# Patient Record
Sex: FEMALE | Race: WHITE | NOT HISPANIC OR LATINO | Employment: STUDENT | URBAN - METROPOLITAN AREA
[De-identification: names, ages, dates, MRNs, and addresses within clinical notes are randomized per-mention and may not be internally consistent; named-entity substitution may affect disease eponyms.]

---
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
## 2024-01-10 ENCOUNTER — PATIENT OUTREACH (OUTPATIENT)
Dept: ADMINISTRATIVE | Facility: HOSPITAL | Age: 19
End: 2024-01-10
Payer: COMMERCIAL

## 2024-01-10 ENCOUNTER — PATIENT MESSAGE (OUTPATIENT)
Dept: ADMINISTRATIVE | Facility: HOSPITAL | Age: 19
End: 2024-01-10
Payer: COMMERCIAL

## 2024-02-22 DIAGNOSIS — F41.9 ANXIETY AND DEPRESSION: ICD-10-CM

## 2024-02-22 DIAGNOSIS — F32.A ANXIETY AND DEPRESSION: ICD-10-CM

## 2024-02-22 RX ORDER — HYDROXYZINE HYDROCHLORIDE 25 MG/1
TABLET, FILM COATED ORAL
Qty: 30 TABLET | Refills: 2 | Status: SHIPPED | OUTPATIENT
Start: 2024-02-22

## 2024-04-11 ENCOUNTER — PATIENT MESSAGE (OUTPATIENT)
Dept: OBSTETRICS AND GYNECOLOGY | Facility: CLINIC | Age: 19
End: 2024-04-11
Payer: COMMERCIAL

## 2024-06-02 DIAGNOSIS — F32.A ANXIETY AND DEPRESSION: ICD-10-CM

## 2024-06-02 DIAGNOSIS — F41.9 ANXIETY AND DEPRESSION: ICD-10-CM

## 2024-06-03 RX ORDER — ESCITALOPRAM OXALATE 10 MG/1
10 TABLET ORAL
Qty: 90 TABLET | Refills: 0 | OUTPATIENT
Start: 2024-06-03

## 2024-06-05 ENCOUNTER — OFFICE VISIT (OUTPATIENT)
Dept: FAMILY MEDICINE | Facility: CLINIC | Age: 19
End: 2024-06-05
Payer: COMMERCIAL

## 2024-06-05 ENCOUNTER — OFFICE VISIT (OUTPATIENT)
Dept: PSYCHIATRY | Facility: CLINIC | Age: 19
End: 2024-06-05
Payer: COMMERCIAL

## 2024-06-05 VITALS
SYSTOLIC BLOOD PRESSURE: 120 MMHG | BODY MASS INDEX: 20.31 KG/M2 | HEIGHT: 63 IN | DIASTOLIC BLOOD PRESSURE: 82 MMHG | OXYGEN SATURATION: 99 % | HEART RATE: 89 BPM | WEIGHT: 114.63 LBS

## 2024-06-05 VITALS
DIASTOLIC BLOOD PRESSURE: 77 MMHG | WEIGHT: 115 LBS | HEART RATE: 89 BPM | BODY MASS INDEX: 20.37 KG/M2 | SYSTOLIC BLOOD PRESSURE: 120 MMHG

## 2024-06-05 DIAGNOSIS — F32.1 CURRENT MODERATE EPISODE OF MAJOR DEPRESSIVE DISORDER WITHOUT PRIOR EPISODE: ICD-10-CM

## 2024-06-05 DIAGNOSIS — G47.00 INSOMNIA, UNSPECIFIED TYPE: ICD-10-CM

## 2024-06-05 DIAGNOSIS — Z76.89 ENCOUNTER TO ESTABLISH CARE WITH NEW DOCTOR: ICD-10-CM

## 2024-06-05 DIAGNOSIS — F41.1 GAD (GENERALIZED ANXIETY DISORDER): Primary | ICD-10-CM

## 2024-06-05 DIAGNOSIS — Z98.890 STATUS POST ABLATION OF ACCESSORY BYPASS TRACT: ICD-10-CM

## 2024-06-05 DIAGNOSIS — Z00.00 ANNUAL PHYSICAL EXAM: Primary | ICD-10-CM

## 2024-06-05 DIAGNOSIS — F41.1 GAD (GENERALIZED ANXIETY DISORDER): ICD-10-CM

## 2024-06-05 PROCEDURE — 90792 PSYCH DIAG EVAL W/MED SRVCS: CPT | Mod: S$GLB,,, | Performed by: PSYCHIATRY & NEUROLOGY

## 2024-06-05 PROCEDURE — 1159F MED LIST DOCD IN RCRD: CPT | Mod: CPTII,S$GLB,, | Performed by: FAMILY MEDICINE

## 2024-06-05 PROCEDURE — 1160F RVW MEDS BY RX/DR IN RCRD: CPT | Mod: CPTII,S$GLB,, | Performed by: PSYCHIATRY & NEUROLOGY

## 2024-06-05 PROCEDURE — 3074F SYST BP LT 130 MM HG: CPT | Mod: CPTII,S$GLB,, | Performed by: FAMILY MEDICINE

## 2024-06-05 PROCEDURE — 3078F DIAST BP <80 MM HG: CPT | Mod: CPTII,S$GLB,, | Performed by: PSYCHIATRY & NEUROLOGY

## 2024-06-05 PROCEDURE — 99395 PREV VISIT EST AGE 18-39: CPT | Mod: S$GLB,,, | Performed by: FAMILY MEDICINE

## 2024-06-05 PROCEDURE — 3074F SYST BP LT 130 MM HG: CPT | Mod: CPTII,S$GLB,, | Performed by: PSYCHIATRY & NEUROLOGY

## 2024-06-05 PROCEDURE — 3079F DIAST BP 80-89 MM HG: CPT | Mod: CPTII,S$GLB,, | Performed by: FAMILY MEDICINE

## 2024-06-05 PROCEDURE — 99999 PR PBB SHADOW E&M-EST. PATIENT-LVL III: CPT | Mod: PBBFAC,,, | Performed by: FAMILY MEDICINE

## 2024-06-05 PROCEDURE — 1159F MED LIST DOCD IN RCRD: CPT | Mod: CPTII,S$GLB,, | Performed by: PSYCHIATRY & NEUROLOGY

## 2024-06-05 PROCEDURE — 3008F BODY MASS INDEX DOCD: CPT | Mod: CPTII,S$GLB,, | Performed by: FAMILY MEDICINE

## 2024-06-05 PROCEDURE — 99999 PR PBB SHADOW E&M-EST. PATIENT-LVL III: CPT | Mod: PBBFAC,,, | Performed by: PSYCHIATRY & NEUROLOGY

## 2024-06-05 RX ORDER — TRAZODONE HYDROCHLORIDE 50 MG/1
50 TABLET ORAL NIGHTLY
Qty: 30 TABLET | Refills: 1 | Status: SHIPPED | OUTPATIENT
Start: 2024-06-05 | End: 2025-06-05

## 2024-06-05 RX ORDER — ESCITALOPRAM OXALATE 20 MG/1
20 TABLET ORAL DAILY
Qty: 90 TABLET | Refills: 0 | Status: SHIPPED | OUTPATIENT
Start: 2024-06-05 | End: 2025-06-05

## 2024-06-05 NOTE — PROGRESS NOTES
(Portions of this note were dictated using voice recognition software and may contain dictation related errors in spelling/grammar/syntax not found on text review)    CC:   Chief Complaint   Patient presents with    [facility]       HPI: 18 y.o. female presented to [facility] as a new patient for routine annual checkup and labs.  She has medical history significant for generalized anxiety disorder, major depression, insomnia.  SVT status post cardiac ablation.    She follows up with a psychiatrist, had most recent visit this morning.  She takes Lexapro 20 mg daily along with hydroxyzine 25 mg on as needed basis, symptoms are stable.  She stated that trazodone was just added to help with the sleep this morning.    She is a student in [place] and currently at home.  She is physically active.      She has Nexplanon for contraception, denies having any menstrual cycle.    She is due for annual labs.    She does not smoke, has no toxic habits.      She denies having any other symptoms or concerns.    Past Medical History:   Diagnosis Date    Anxiety disorder, unspecified     Arm fracture     Depression     Staph infection     SVT (supraventricular tachycardia)        Past Surgical History:   Procedure Laterality Date    ABLATION Bilateral 3/24/2023    Procedure: Ablation;  Surgeon: Elmer Platt MD;  Location: [facility];  Service: Cardiology;  Laterality: Bilateral;  SVT, RFA, +/- loop;  DIANE, MDT; Gen/MAC, 3prep, Lc       Family History   Problem Relation Name Age of Onset    No Known Problems Mother      Stroke Father      Hypertension Father      Obesity Father      ADD / ADHD Sister 2     Bipolar disorder Sister 2     Cardiomyopathy Maternal Grandmother      Arrhythmia Maternal Grandmother          HCOM and JORDAN    Heart attack Maternal Grandmother      Heart disease Maternal Grandfather      Heart attack Maternal Grandfather      Heart disease Paternal Grandmother      Cancer Paternal Grandmother       Congenital heart disease Paternal Grandmother      Stroke Paternal Grandmother      Heart attack Paternal Grandmother      Stroke Paternal Grandfather      Early death Neg Hx      Heart attacks under age 50 Neg Hx      Pacemaker/defibrilator Neg Hx         Social History     Tobacco Use    Smoking status: Never    Smokeless tobacco: Never   Substance Use Topics    Alcohol use: Never    Drug use: Never       Lab Results   Component Value Date    WBC 5.52 09/02/2022    HGB 13.2 09/02/2022    HCT 41.0 09/02/2022    MCV 84 09/02/2022     09/02/2022    ALT 16 09/02/2022    AST 29 09/02/2022    BILITOT 0.3 09/02/2022    ALKPHOS 66 09/02/2022     (L) 09/02/2022    K 3.6 09/02/2022     09/02/2022    CREATININE 0.7 09/02/2022    CALCIUM 8.8 09/02/2022    ALBUMIN 3.8 09/02/2022    BUN 6 09/02/2022    CO2 22 (L) 09/02/2022    TSH 1.079 01/05/2023    GLU 83 09/02/2022             Vital signs reviewed  PE:   APPEARANCE: Well nourished, well developed, in no acute distress.    HEAD: Normocephalic, atraumatic.  EYES: EOMI.  Conjunctivae noninjected.  EAR:  Cerumen impaction in right ear  NOSE: Mucosa pink. Airway clear  NECK: Supple with no cervical lymphadenopathy.    CHEST: Good inspiratory effort. Lungs clear to auscultation with no wheezes or crackles.  CARDIOVASCULAR: Normal S1, S2. No rubs, murmurs, or gallops.  ABDOMEN: Bowel sounds normal. Not distended. Soft. No tenderness or masses. No organomegaly.  EXTREMITIES: No edema, cyanosis, or clubbing.    Review of Systems   Constitutional:  Negative for chills, fatigue and fever.   HENT: Negative.     Respiratory:  Negative for cough, shortness of breath and wheezing.    Cardiovascular:  Negative for chest pain, palpitations and leg swelling.   Gastrointestinal: Negative.    Genitourinary: Negative.    Neurological: Negative.    Psychiatric/Behavioral: Negative.     All other systems reviewed and are negative.      IMPRESSION  1. Annual physical exam    2.  RUPINDER (generalized anxiety disorder)    3. Current moderate episode of major depressive disorder without prior episode    4. Status post ablation of accessory bypass tract    5. Encounter to establish care with new doctor    6. Insomnia, unspecified type            PLAN      1. Annual physical exam    - CBC Auto Differential; Future  - Comprehensive Metabolic Panel; Future  - TSH; Future  - Lipid Panel; Future  - Hemoglobin A1C; Future      2. RUPINDER (generalized anxiety disorder)      3. Current moderate episode of major depressive disorder without prior episode    Stable     Followed by psychiatry     Continue current medications      4. Status post ablation of accessory bypass tract    Stable    Denies having any problems      5. Encounter to establish care with new doctor      6. Insomnia, unspecified type     To start trazodone        SCREENINGS      Immunizations:     Up-to-date with Tdap     Had 4 doses of COVID vaccine       Age/demographic appropriate health maintenance:    Health Maintenance Due   Topic Date Due    Lipid Panel  Never done           Spent adequate time in obtaining history and explaining differentials     30 minutes spent during this visit of which greater than 50% devoted to face-face counseling and coordination of care regarding diagnosis and management plan       Miguel Ángel Hardy   6/5/2024
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
## 2024-06-06 ENCOUNTER — LAB VISIT (OUTPATIENT)
Dept: LAB | Facility: HOSPITAL | Age: 19
End: 2024-06-06
Attending: FAMILY MEDICINE
Payer: COMMERCIAL

## 2024-06-06 DIAGNOSIS — Z00.00 ANNUAL PHYSICAL EXAM: ICD-10-CM

## 2024-06-06 LAB
ALBUMIN SERPL BCP-MCNC: 3.9 G/DL (ref 3.5–5.2)
ALP SERPL-CCNC: 65 U/L (ref 55–135)
ALT SERPL W/O P-5'-P-CCNC: 8 U/L (ref 10–44)
ANION GAP SERPL CALC-SCNC: 9 MMOL/L (ref 8–16)
AST SERPL-CCNC: 16 U/L (ref 10–40)
BASOPHILS # BLD AUTO: 0.05 K/UL (ref 0–0.2)
BASOPHILS NFR BLD: 0.8 % (ref 0–1.9)
BILIRUB SERPL-MCNC: 0.7 MG/DL (ref 0.1–1)
BUN SERPL-MCNC: 8 MG/DL (ref 6–20)
CALCIUM SERPL-MCNC: 9.5 MG/DL (ref 8.7–10.5)
CHLORIDE SERPL-SCNC: 107 MMOL/L (ref 95–110)
CHOLEST SERPL-MCNC: 129 MG/DL (ref 120–199)
CHOLEST/HDLC SERPL: 2.4 {RATIO} (ref 2–5)
CO2 SERPL-SCNC: 23 MMOL/L (ref 23–29)
CREAT SERPL-MCNC: 0.8 MG/DL (ref 0.5–1.4)
DIFFERENTIAL METHOD BLD: ABNORMAL
EOSINOPHIL # BLD AUTO: 0.6 K/UL (ref 0–0.5)
EOSINOPHIL NFR BLD: 9.1 % (ref 0–8)
ERYTHROCYTE [DISTWIDTH] IN BLOOD BY AUTOMATED COUNT: 12.5 % (ref 11.5–14.5)
EST. GFR  (NO RACE VARIABLE): >60 ML/MIN/1.73 M^2
ESTIMATED AVG GLUCOSE: 103 MG/DL (ref 68–131)
GLUCOSE SERPL-MCNC: 90 MG/DL (ref 70–110)
HBA1C MFR BLD: 5.2 % (ref 4–5.6)
HCT VFR BLD AUTO: 43.5 % (ref 37–48.5)
HDLC SERPL-MCNC: 53 MG/DL (ref 40–75)
HDLC SERPL: 41.1 % (ref 20–50)
HGB BLD-MCNC: 13.6 G/DL (ref 12–16)
IMM GRANULOCYTES # BLD AUTO: 0.02 K/UL (ref 0–0.04)
IMM GRANULOCYTES NFR BLD AUTO: 0.3 % (ref 0–0.5)
LDLC SERPL CALC-MCNC: 60.2 MG/DL (ref 63–159)
LYMPHOCYTES # BLD AUTO: 1.6 K/UL (ref 1–4.8)
LYMPHOCYTES NFR BLD: 24.6 % (ref 18–48)
MCH RBC QN AUTO: 26.2 PG (ref 27–31)
MCHC RBC AUTO-ENTMCNC: 31.3 G/DL (ref 32–36)
MCV RBC AUTO: 84 FL (ref 82–98)
MONOCYTES # BLD AUTO: 0.5 K/UL (ref 0.3–1)
MONOCYTES NFR BLD: 8 % (ref 4–15)
NEUTROPHILS # BLD AUTO: 3.7 K/UL (ref 1.8–7.7)
NEUTROPHILS NFR BLD: 57.2 % (ref 38–73)
NONHDLC SERPL-MCNC: 76 MG/DL
NRBC BLD-RTO: 0 /100 WBC
PLATELET # BLD AUTO: 287 K/UL (ref 150–450)
PMV BLD AUTO: 10.3 FL (ref 9.2–12.9)
POTASSIUM SERPL-SCNC: 4.4 MMOL/L (ref 3.5–5.1)
PROT SERPL-MCNC: 7.1 G/DL (ref 6–8.4)
RBC # BLD AUTO: 5.2 M/UL (ref 4–5.4)
SODIUM SERPL-SCNC: 139 MMOL/L (ref 136–145)
TRIGL SERPL-MCNC: 79 MG/DL (ref 30–150)
TSH SERPL DL<=0.005 MIU/L-ACNC: 2.34 UIU/ML (ref 0.4–4)
WBC # BLD AUTO: 6.47 K/UL (ref 3.9–12.7)

## 2024-06-06 PROCEDURE — 83036 HEMOGLOBIN GLYCOSYLATED A1C: CPT | Performed by: FAMILY MEDICINE

## 2024-06-06 PROCEDURE — 80053 COMPREHEN METABOLIC PANEL: CPT | Performed by: FAMILY MEDICINE

## 2024-06-06 PROCEDURE — 85025 COMPLETE CBC W/AUTO DIFF WBC: CPT | Performed by: FAMILY MEDICINE

## 2024-06-06 PROCEDURE — 36415 COLL VENOUS BLD VENIPUNCTURE: CPT | Performed by: FAMILY MEDICINE

## 2024-06-06 PROCEDURE — 80061 LIPID PANEL: CPT | Performed by: FAMILY MEDICINE

## 2024-06-06 PROCEDURE — 84443 ASSAY THYROID STIM HORMONE: CPT | Performed by: FAMILY MEDICINE

## 2024-07-12 ENCOUNTER — OFFICE VISIT (OUTPATIENT)
Dept: OTOLARYNGOLOGY | Facility: CLINIC | Age: 19
End: 2024-07-12
Payer: COMMERCIAL

## 2024-07-12 ENCOUNTER — LAB VISIT (OUTPATIENT)
Dept: LAB | Facility: HOSPITAL | Age: 19
End: 2024-07-12
Attending: OTOLARYNGOLOGY
Payer: COMMERCIAL

## 2024-07-12 VITALS
HEART RATE: 116 BPM | BODY MASS INDEX: 19.78 KG/M2 | SYSTOLIC BLOOD PRESSURE: 114 MMHG | WEIGHT: 111.69 LBS | DIASTOLIC BLOOD PRESSURE: 78 MMHG

## 2024-07-12 DIAGNOSIS — J34.2 NASAL SEPTAL DEVIATION: ICD-10-CM

## 2024-07-12 DIAGNOSIS — J30.9 CHRONIC ALLERGIC RHINITIS: Primary | ICD-10-CM

## 2024-07-12 DIAGNOSIS — J30.9 CHRONIC ALLERGIC RHINITIS: ICD-10-CM

## 2024-07-12 DIAGNOSIS — J34.3 HYPERTROPHY OF INFERIOR NASAL TURBINATE: ICD-10-CM

## 2024-07-12 DIAGNOSIS — J34.89 NASAL OBSTRUCTION: ICD-10-CM

## 2024-07-12 PROCEDURE — 86003 ALLG SPEC IGE CRUDE XTRC EA: CPT | Performed by: OTOLARYNGOLOGY

## 2024-07-12 PROCEDURE — 99999 PR PBB SHADOW E&M-EST. PATIENT-LVL III: CPT | Mod: PBBFAC,,, | Performed by: OTOLARYNGOLOGY

## 2024-07-12 PROCEDURE — 86003 ALLG SPEC IGE CRUDE XTRC EA: CPT | Mod: 59 | Performed by: OTOLARYNGOLOGY

## 2024-07-12 PROCEDURE — 36415 COLL VENOUS BLD VENIPUNCTURE: CPT | Performed by: OTOLARYNGOLOGY

## 2024-07-12 RX ORDER — AZELASTINE 1 MG/ML
1 SPRAY, METERED NASAL 2 TIMES DAILY
Qty: 30 ML | Refills: 3 | Status: SHIPPED | OUTPATIENT
Start: 2024-07-12 | End: 2025-07-12

## 2024-07-12 RX ORDER — LEVOCETIRIZINE DIHYDROCHLORIDE 5 MG/1
5 TABLET, FILM COATED ORAL NIGHTLY
Qty: 30 TABLET | Refills: 11 | Status: SHIPPED | OUTPATIENT
Start: 2024-07-12 | End: 2025-07-12

## 2024-07-12 RX ORDER — METHYLPREDNISOLONE 4 MG/1
TABLET ORAL
Qty: 1 EACH | Refills: 0 | Status: SHIPPED | OUTPATIENT
Start: 2024-07-12

## 2024-07-12 RX ORDER — AMOXICILLIN AND CLAVULANATE POTASSIUM 875; 125 MG/1; MG/1
1 TABLET, FILM COATED ORAL 2 TIMES DAILY
Qty: 20 TABLET | Refills: 0 | Status: CANCELLED | OUTPATIENT
Start: 2024-07-12 | End: 2024-07-22

## 2024-07-12 RX ORDER — FLUTICASONE PROPIONATE 50 MCG
2 SPRAY, SUSPENSION (ML) NASAL DAILY
Qty: 9.9 ML | Refills: 11 | Status: SHIPPED | OUTPATIENT
Start: 2024-07-12

## 2024-07-12 NOTE — PROGRESS NOTES
Chief Complaint   Patient presents with    Nasal Congestion     Nasal blockage for more than three years also causes her to choke when eating and also throat agitation    .    HPI:     Lupe Machuca is a 18 y.o. female who presents for evaluation of a several year history of nasal obstruction. She reports that she has been having blockage of her nose on both sides. She states that she feels that she can not breath through her nose when eating/drinking.  She reports that she has had decreased in sense of smell. She has been snoring as well. This problem has been gradually worsening.  She has been using zyrtec daily. She has been using brenna's vaporub to her nose nightly. She does not use sinus rinses or nasal sprays. She admits to midface pain and pressure.  She admits to rhinorrhea and postnasal drip.   She has not had sinus or nasal surgery. There is no history of sinonasal trauma. She has not had formal allergy testing. She notes that symptoms seem to be worse around dogs/cats. 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           Past Medical History:   Diagnosis Date    Anxiety disorder, unspecified     Arm fracture     Depression     Staph infection     SVT (supraventricular tachycardia)      Social History     Socioeconomic History    Marital status: Single   Tobacco Use    Smoking status: Never    Smokeless tobacco: Never   Substance and Sexual Activity    Alcohol use: Never    Drug use: Never    Sexual activity: Yes     Partners: Male     Birth control/protection: Condom   Social History Narrative    Lives with mom, stepdad, sister, and nephew.     2 dogs.    No smokers    In 12th grade.       Social Determinants of Health     Financial Resource Strain: Low Risk  (6/5/2024)    Overall Financial Resource Strain (CARDIA)     Difficulty of Paying Living Expenses: Not hard at all   Food Insecurity: No Food Insecurity (6/5/2024)    Hunger Vital Sign     Worried About Running Out of Food in the Last Year:  Never true     Ran Out of Food in the Last Year: Never true   Physical Activity: Insufficiently Active (6/5/2024)    Exercise Vital Sign     Days of Exercise per Week: 2 days     Minutes of Exercise per Session: 20 min   Stress: Stress Concern Present (6/5/2024)    [place] [employer] - Occupational Stress Questionnaire     Feeling of Stress : Very much   Housing Stability: Unknown (6/5/2024)    Housing Stability Vital Sign     Unable to Pay for Housing in the Last Year: Patient declined     Past Surgical History:   Procedure Laterality Date    ABLATION Bilateral 3/24/2023    Procedure: Ablation;  Surgeon: Elmer Platt MD;  Location: [facility] LAB;  Service: Cardiology;  Laterality: Bilateral;  SVT, RFA, +/- loop;  DIANE, GABY; Gen/MAC, 3prepLc     Family History   Problem Relation Name Age of Onset    No Known Problems Mother      Stroke Father      Hypertension Father      Obesity Father      ADD / ADHD Sister 2     Bipolar disorder Sister 2     Cardiomyopathy Maternal Grandmother      Arrhythmia Maternal Grandmother          HCOM and JORDAN    Heart attack Maternal Grandmother      Heart disease Maternal Grandfather      Heart attack Maternal Grandfather      Heart disease Paternal Grandmother      Cancer Paternal Grandmother      Congenital heart disease Paternal Grandmother      Stroke Paternal Grandmother      Heart attack Paternal Grandmother      Stroke Paternal Grandfather      Early death Neg Hx      Heart attacks under age 50 Neg Hx      Pacemaker/defibrilator Neg Hx             Review of Systems  General: negative for chills, fever or weight loss  Psychological: negative for mood changes or depression  Ophthalmic: negative for blurry vision, photophobia or eye pain  ENT: see HPI  Respiratory: no cough, shortness of breath, or wheezing  Cardiovascular: no chest pain or dyspnea on exertion  Gastrointestinal: no abdominal pain, change in bowel habits, or black/ bloody  stools  Musculoskeletal: negative for gait disturbance or muscular weakness  Neurological: no syncope or seizures; no ataxia  Dermatological: negative for puritis,  rash and jaundice  Hematologic/lymphatic: no easy bruising, no new lumps or bumps      Physical Exam:    Vitals:    07/12/24 0836   BP: 114/78   Pulse: (!) 116       Constitutional: Well appearing / communicating without difficutly.  NAD.  Eyes: EOM I Bilaterally  Head/Face: Normocephalic.  Negative paranasal sinus pressure/tenderness.  Salivary glands WNL.  House Brackmann I Bilaterally.    Right Ear: Auricle normal appearance. External Auditory Canal within normal limits,TM w/o masses/lesions/perforations. TM mobility noted.   Left Ear: Auricle normal appearance. External Auditory Canal WNL,TM w/o masses/lesions/perforations. TM mobility noted.  Nose: + severe mucosal edema; + nasal septal deviation. Inferior Turbinates boggy, edematous, hypertrophic with clear rhinorrhea present  bilaterally. No septal perforation. No masses/lesions. External nasal skin appears normal without masses/lesions.  Oral Cavity: Gingiva/lips within normal limits.  Dentition/gingiva healthy appearing. Mucus membranes moist. Floor of mouth soft, no masses palpated. Oral Tongue mobile. Hard Palate appears normal.    Oropharynx: Base of tongue appears normal. No masses/lesions noted. Tonsillar fossa/pharyngeal wall without lesions. Posterior oropharynx WNL.  Soft palate without masses. Midline uvula.   Neck/Lymphatic: No LAD I-VI bilaterally.  No thyromegaly.  No masses noted on exam.        Assessment:    ICD-10-CM ICD-9-CM    1. Chronic allergic rhinitis  J30.9 477.9 Aspergillus fumagatus IgE      Bermuda grass IgE      Cat epithelium IgE      Cladosporium IgE      [place], bald IgE      D. farinae IgE      D. pteronyssinus IgE      Dog dander IgE      Plantain, English IgE      Christiano grass IgE      Marsh elder, rough IgE      Mugwort IgE      Nettle IgE      Oak, white IgE       Penicillium IgE      Ragweed, short, common IgE      Aditya IgE      Allergen, Cocklebur      Allergen, Elm [place]      Allergen, Meadow Grass ([place] Blue)      Allergen, Mucor Racemosus      Allergen, Pecan Tree IgE      Allergen, White Mychal      Allergen-Alternaria Alternata      Allergen-[place]      Allergen-Common Pigweed      Allergen-Silver Birch      Feather Panel #2      RAST Allergen for [place]      RAST Allergen Maple ([place])      RAST Allergen [place]      RAST Allergen, Lamb's Quarters      RAST Allergen, Sheep [place](Yellow Dock)      CT Medtronic Sinuses without      2. Hypertrophy of inferior nasal turbinate  J34.3 478.0       3. Nasal septal deviation  J34.2 470       4. Nasal obstruction  J34.89 478.19 CT Medtronic Sinuses without        The primary encounter diagnosis was Chronic allergic rhinitis. Diagnoses of Hypertrophy of inferior nasal turbinate, Nasal septal deviation, and Nasal obstruction were also pertinent to this visit.      Plan:  Orders Placed This Encounter   Procedures    CT Medtronic Sinuses without    Aspergillus fumagatus IgE    Bermuda grass IgE    Cat epithelium IgE    Cladosporium IgE    [place], bald IgE    D. farinae IgE    D. pteronyssinus IgE    Dog dander IgE    Plantain, English IgE    Christiano grass IgE    Marsh elder, rough IgE    Mugwort IgE    Nettle IgE    Oak, white IgE    Penicillium IgE    Ragweed, short, common IgE    Aditya IgE    Allergen, Cocklebur    Allergen, Elm [place]    Allergen, Meadow Grass ([place] Blue)    Allergen, Mucor Racemosus    Allergen, Pecan Tree IgE    Allergen, White Mychal    Allergen-Alternaria Alternata    Allergen-[place]    Allergen-Common Pigweed    Allergen-Silver Birch    Feather Panel #2    RAST Allergen for [place]    RAST Allergen Maple ([place])    RAST Allergen [place]    RAST Allergen, Lamb's Quarters    RAST Allergen, Sheep [place](Yellow Dock)     I had a long discussion with the patient regarding  the diagnosis of rhinitis medicamentosa.  We discussed the physiologic response of the nasal mucosa to the OTC nasal sprays, and that rhinitis medicamentosa is a condition of rebound nasal congestion as a result of extended use of topical decongestants that constrict blood vessels in the lining of the nose such as Vicks.  The patient expressed understanding, and is ready to try and stop the use of topical nasal decongestants.  We will start medrol dose amber for the next several days to help with congestion while stopping use of Vicks.    Recommend Alin Med Sinus Rinse BID; distilled water only(maintenance).  Start Flonase 2 sprays per nostril daiy (spray laterally).  Start Astelin 1 spray per nostril BID  Start xyzal 5mg PO daily  Obtain immunoCAP Testing  Obtain CT scan of the sinuses to further assess.     Jo Fairchild MD

## 2024-07-12 NOTE — PROCEDURES
Nasal/sinus endoscopy    Date/Time: 7/12/2024 8:40 AM    Performed by: Jo Sanderson MD  Authorized by: Jo Sanderson MD    Consent Done?:  Yes (Verbal)  Anesthesia:     Local anesthetic:  Lidocaine 2% and Pato-Synephrine 1/2%  Nose:     Procedure Performed:  Nasal Endoscopy  External:      No external nasal deformity  Intranasal:      Mucosa no polyps     Mucosa ulcers not present     No mucosa lesions present     Enlarged turbinates     Septum gross deformity (NSD to the left)  Nasopharynx:      Posterior choanae patent     Eustachian tube patent     Inferior Turbinates boggy, edematous, hypertrophic with clear rhinorrhea present  bilaterally causing blockage of the inferior meatus bilaterally  Bilateral middle turbinate edema and hypertrophy with obstruction of the middle meatus bilaterally.  The superior turbinate was edematous and boggy with narrowed  sphenoethmoidal recess bilaterally.  Note: patient had vasovagal response after procedure responded to conservative management.

## 2024-07-15 ENCOUNTER — HOSPITAL ENCOUNTER (OUTPATIENT)
Dept: RADIOLOGY | Facility: HOSPITAL | Age: 19
Discharge: HOME | End: 2024-07-15
Attending: OTOLARYNGOLOGY
Payer: COMMERCIAL

## 2024-07-15 DIAGNOSIS — J30.9 CHRONIC ALLERGIC RHINITIS: ICD-10-CM

## 2024-07-15 DIAGNOSIS — J34.89 NASAL OBSTRUCTION: ICD-10-CM

## 2024-07-15 PROCEDURE — 70486 CT MAXILLOFACIAL W/O DYE: CPT | Mod: 26,,, | Performed by: RADIOLOGY

## 2024-07-15 PROCEDURE — 70486 CT MAXILLOFACIAL W/O DYE: CPT | Mod: TC

## 2024-07-16 LAB
A ALTERNATA IGE QN: <0.1 KU/L
A FUMIGATUS IGE QN: <0.1 KU/L
ALLERGEN BOXELDER MAPLE TREE IGE: 0.62 KU/L
ALLERGEN MAPLE (BOX ELDER) CLASS: ABNORMAL
ALLERGEN MULBERRY CLASS: NORMAL
ALLERGEN MULBERRY TREE IGE: <0.1 KU/L
ALLERGEN PIGWEED IGE: <0.1 KU/L
ALLERGEN WHITE ASH TREE IGE: 1.04 KU/L
AMER SYCAMORE IGE QN: 0.46 KU/L
BALD CYPRESS IGE QN: <0.1 KU/L
BERMUDA GRASS IGE QN: 0.15 KU/L
C HERBARUM IGE QN: <0.1 KU/L
CAT DANDER IGE QN: 3.46 KU/L
CEDAR IGE QN: <0.1 KU/L
COCKLEBUR IGE QN: 0.12 KU/L
COMMON PIGWEED CLASS: NORMAL
COMMON RAGWEED IGE QN: 0.28 KU/L
D FARINAE IGE QN: 22.3 KU/L
D PTERONYSS IGE QN: 13.6 KU/L
DEPRECATED A ALTERNATA IGE RAST QL: NORMAL
DEPRECATED A FUMIGATUS IGE RAST QL: NORMAL
DEPRECATED BALD CYPRESS IGE RAST QL: NORMAL
DEPRECATED BERMUDA GRASS IGE RAST QL: ABNORMAL
DEPRECATED C HERBARUM IGE RAST QL: NORMAL
DEPRECATED CAT DANDER IGE RAST QL: ABNORMAL
DEPRECATED CEDAR IGE RAST QL: NORMAL
DEPRECATED COCKLEBUR IGE RAST QL: ABNORMAL
DEPRECATED COMMON RAGWEED IGE RAST QL: ABNORMAL
DEPRECATED D FARINAE IGE RAST QL: ABNORMAL
DEPRECATED D PTERONYSS IGE RAST QL: ABNORMAL
DEPRECATED DOG DANDER IGE RAST QL: ABNORMAL
DEPRECATED ELDER IGE RAST QL: NORMAL
DEPRECATED ENGL PLANTAIN IGE RAST QL: NORMAL
DEPRECATED GOOSEFOOT IGE RAST QL: ABNORMAL
DEPRECATED JOHNSON GRASS IGE RAST QL: ABNORMAL
DEPRECATED KENT BLUE GRASS IGE RAST QL: ABNORMAL
DEPRECATED M RACEMOSUS IGE RAST QL: NORMAL
DEPRECATED MUGWORT IGE RAST QL: NORMAL
DEPRECATED NETTLE IGE RAST QL: ABNORMAL
DEPRECATED P NOTATUM IGE RAST QL: NORMAL
DEPRECATED PECAN/HICK TREE IGE RAST QL: ABNORMAL
DEPRECATED SHEEP SORREL IGE RAST QL: NORMAL
DEPRECATED SILVER BIRCH IGE RAST QL: ABNORMAL
DEPRECATED TIMOTHY IGE RAST QL: ABNORMAL
DEPRECATED WHITE OAK IGE RAST QL: ABNORMAL
DOG DANDER IGE QN: 44.8 KU/L
ELDER IGE QN: 0.1 KU/L
ELM CEDAR CLASS: ABNORMAL
ELM CEDAR, IGE: 0.48 KU/L
ENGL PLANTAIN IGE QN: <0.1 KU/L
FEATHER PANEL #2: <0.1 KU/L
GOOSEFOOT IGE QN: 0.14 KU/L
JOHNSON GRASS IGE QN: 0.28 KU/L
KENT BLUE GRASS IGE QN: 0.66 KU/L
M RACEMOSUS IGE QN: <0.1 KU/L
MUGWORT IGE QN: <0.1 KU/L
NETTLE IGE QN: 0.42 KU/L
P NOTATUM IGE QN: <0.1 KU/L
PECAN/HICK TREE IGE QN: 2.29 KU/L
SHEEP SORREL IGE QN: <0.1 KU/L
SILVER BIRCH IGE QN: 9.35 KU/L
TIMOTHY IGE QN: 0.36 KU/L
WHITE ASH CLASS: ABNORMAL
WHITE OAK IGE QN: 14.5 KU/L

## 2024-08-12 ENCOUNTER — OFFICE VISIT (OUTPATIENT)
Dept: OTOLARYNGOLOGY | Facility: CLINIC | Age: 19
End: 2024-08-12
Payer: COMMERCIAL

## 2024-08-12 VITALS
WEIGHT: 115.19 LBS | BODY MASS INDEX: 20.41 KG/M2 | DIASTOLIC BLOOD PRESSURE: 73 MMHG | HEART RATE: 118 BPM | SYSTOLIC BLOOD PRESSURE: 123 MMHG

## 2024-08-12 DIAGNOSIS — J34.89 NASAL OBSTRUCTION: ICD-10-CM

## 2024-08-12 DIAGNOSIS — J34.2 NASAL SEPTAL DEVIATION: Primary | Chronic | ICD-10-CM

## 2024-08-12 DIAGNOSIS — J34.2 NASAL SEPTAL DEVIATION: Primary | ICD-10-CM

## 2024-08-12 DIAGNOSIS — J34.89 CONCHA BULLOSA: ICD-10-CM

## 2024-08-12 DIAGNOSIS — J34.3 HYPERTROPHY OF INFERIOR NASAL TURBINATE: ICD-10-CM

## 2024-08-12 DIAGNOSIS — J30.9 CHRONIC ALLERGIC RHINITIS: Chronic | ICD-10-CM

## 2024-08-12 DIAGNOSIS — J34.3 HYPERTROPHY OF INFERIOR NASAL TURBINATE: Chronic | ICD-10-CM

## 2024-08-12 PROCEDURE — 99999 PR PBB SHADOW E&M-EST. PATIENT-LVL III: CPT | Mod: PBBFAC,,, | Performed by: OTOLARYNGOLOGY

## 2024-08-12 PROCEDURE — 3008F BODY MASS INDEX DOCD: CPT | Mod: CPTII,S$GLB,, | Performed by: OTOLARYNGOLOGY

## 2024-08-12 PROCEDURE — 3044F HG A1C LEVEL LT 7.0%: CPT | Mod: CPTII,S$GLB,, | Performed by: OTOLARYNGOLOGY

## 2024-08-12 PROCEDURE — 1159F MED LIST DOCD IN RCRD: CPT | Mod: CPTII,S$GLB,, | Performed by: OTOLARYNGOLOGY

## 2024-08-12 PROCEDURE — 99214 OFFICE O/P EST MOD 30 MIN: CPT | Mod: S$GLB,,, | Performed by: OTOLARYNGOLOGY

## 2024-08-12 PROCEDURE — 1160F RVW MEDS BY RX/DR IN RCRD: CPT | Mod: CPTII,S$GLB,, | Performed by: OTOLARYNGOLOGY

## 2024-08-12 PROCEDURE — 3074F SYST BP LT 130 MM HG: CPT | Mod: CPTII,S$GLB,, | Performed by: OTOLARYNGOLOGY

## 2024-08-12 PROCEDURE — 3078F DIAST BP <80 MM HG: CPT | Mod: CPTII,S$GLB,, | Performed by: OTOLARYNGOLOGY

## 2024-08-12 NOTE — LETTER
August 12, 2024      [facility]  [address]  [address]  [place] [address]  Phone: [phone]  Fax: [phone]       Patient: Lupe Machuca   YOB: 2005  Date of Visit: 08/12/2024    To Whom It May Concern:    Papa Machuca  was at [facility] on 08/12/2024. The patient has been diagnosed with allergic rhinitis. She is allergic to dog dander which may be effecting her health. Please allow her to work for the remainder of this week.     Sincerely,    Jo Walsh MD

## 2024-08-12 NOTE — PROGRESS NOTES
Chief Complaint   Patient presents with    Follow-up     No improvement since last visit   .    HPI:     Lupe Machuca is a 18 y.o. female who presents for evaluation of a several year history of nasal obstruction. She reports that she has been having blockage of her nose on both sides. She states that she feels that she can not breath through her nose when eating/drinking.  She reports that she has had decreased in sense of smell. She has been snoring as well. This problem has been gradually worsening.  She has been using zyrtec daily. She has been using brenna's vaporub to her nose nightly. She does not use sinus rinses or nasal sprays. She admits to midface pain and pressure.  She admits to rhinorrhea and postnasal drip.   She has not had sinus or nasal surgery. There is no history of sinonasal trauma. She has not had formal allergy testing. She notes that symptoms seem to be worse around dogs/cats. 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       Interval HPI 8/12/2024:  Follow up visit. Reports that she is still feeling nasal congestion/obstruction. She has been using saline, flonase, astelin, and xyzal. She does not feel that this provided much benefit. She has been able to stop brenna's nasal spray. Completed immunoCAP testing and CT scan sinus.       Past Medical History:   Diagnosis Date    Anxiety disorder, unspecified     Arm fracture     Depression     Staph infection     SVT (supraventricular tachycardia)      Social History     Socioeconomic History    Marital status: Single   Tobacco Use    Smoking status: Never    Smokeless tobacco: Never   Substance and Sexual Activity    Alcohol use: Never    Drug use: Never    Sexual activity: Yes     Partners: Male     Birth control/protection: Condom   Social History Narrative    Lives with mom, stepdad, sister, and nephew.     2 dogs.    No smokers    In 12th grade.       Social Determinants of Health     Financial Resource Strain: Low Risk  (6/5/2024)     Overall Financial Resource Strain (CARDIA)     Difficulty of Paying Living Expenses: Not hard at all   Food Insecurity: No Food Insecurity (6/5/2024)    Hunger Vital Sign     Worried About Running Out of Food in the Last Year: Never true     Ran Out of Food in the Last Year: Never true   Physical Activity: Insufficiently Active (6/5/2024)    Exercise Vital Sign     Days of Exercise per Week: 2 days     Minutes of Exercise per Session: 20 min   Stress: Stress Concern Present (6/5/2024)    [place] [employer] - Occupational Stress Questionnaire     Feeling of Stress : Very much   Housing Stability: Unknown (6/5/2024)    Housing Stability Vital Sign     Unable to Pay for Housing in the Last Year: Patient declined     Past Surgical History:   Procedure Laterality Date    ABLATION Bilateral 3/24/2023    Procedure: Ablation;  Surgeon: Elmer Platt MD;  Location: [facility];  Service: Cardiology;  Laterality: Bilateral;  SVT, RFA, +/- loop;  DIANE, MDT; Gen/MAC, 3prep, Lc     Family History   Problem Relation Name Age of Onset    No Known Problems Mother      Stroke Father      Hypertension Father      Obesity Father      ADD / ADHD Sister 2     Bipolar disorder Sister 2     Cardiomyopathy Maternal Grandmother      Arrhythmia Maternal Grandmother          HCOM and JORDAN    Heart attack Maternal Grandmother      Heart disease Maternal Grandfather      Heart attack Maternal Grandfather      Heart disease Paternal Grandmother      Cancer Paternal Grandmother      Congenital heart disease Paternal Grandmother      Stroke Paternal Grandmother      Heart attack Paternal Grandmother      Stroke Paternal Grandfather      Early death Neg Hx      Heart attacks under age 50 Neg Hx      Pacemaker/defibrilator Neg Hx             Review of Systems  General: negative for chills, fever or weight loss  Psychological: negative for mood changes or depression  Ophthalmic: negative for blurry vision, photophobia or eye  pain  ENT: see HPI  Respiratory: no cough, shortness of breath, or wheezing  Cardiovascular: no chest pain or dyspnea on exertion  Gastrointestinal: no abdominal pain, change in bowel habits, or black/ bloody stools  Musculoskeletal: negative for gait disturbance or muscular weakness  Neurological: no syncope or seizures; no ataxia  Dermatological: negative for puritis,  rash and jaundice  Hematologic/lymphatic: no easy bruising, no new lumps or bumps      Physical Exam:    Vitals:    08/12/24 1019   BP: 123/73   Pulse: (!) 118         Constitutional: Well appearing / communicating without difficutly.  NAD.  Eyes: EOM I Bilaterally  Head/Face: Normocephalic.  Negative paranasal sinus pressure/tenderness.  Salivary glands WNL.  House Brackmann I Bilaterally.    Right Ear: Auricle normal appearance. External Auditory Canal within normal limits,TM w/o masses/lesions/perforations. TM mobility noted.   Left Ear: Auricle normal appearance. External Auditory Canal WNL,TM w/o masses/lesions/perforations. TM mobility noted.  Nose: + severe mucosal edema; + nasal septal deviation to the left. Inferior Turbinates boggy, edematous, hypertrophic with clear rhinorrhea present  bilaterally. No septal perforation. No masses/lesions. External nasal skin appears normal without masses/lesions.  Oral Cavity: Gingiva/lips within normal limits.  Dentition/gingiva healthy appearing. Mucus membranes moist. Floor of mouth soft, no masses palpated. Oral Tongue mobile. Hard Palate appears normal.    Oropharynx: Base of tongue appears normal. No masses/lesions noted. Tonsillar fossa/pharyngeal wall without lesions. Posterior oropharynx WNL.  Soft palate without masses. Midline uvula.   Neck/Lymphatic: No LAD I-VI bilaterally.  No thyromegaly.  No masses noted on exam.      Diagnostic testing:   ImmunoCAP:    Latest Reference Range & Units 07/12/24 09:57   A. alternata IgE <0.10 kU/L <0.10   Altern. alternata Class  CLASS 0   Aspergillus  Fumigatus IgE <0.10 kU/L <0.10   A. fumigatus Class  CLASS 0   Allergen Sheep [place] ([place]) IgE <0.10 kU/L <0.10   Allergen Sheep [place] ([place]) Class  CLASS 0   Bald [place] Class  CLASS 0   Bermuda Grass IgE <0.10 kU/L 0.15 (H)   Bermuda Grass Class  CLASS 0/1   [place] Maple Tree IgE <0.10 kU/L 0.62 (H)   Allergen Maple ([place]) Class  CLASS 1   Cat Dander IgE <0.10 kU/L 3.46 (H)   Cat Epithelium Class  CLASS 2   [place] IgE <0.10 kU/L <0.10   [place] Class  CLASS 0   Cladosporium Class  CLASS 0   Cladosporium IgE <0.10 kU/L <0.10   Cocklebur Class  CLASS 0/1   Cocklebur IgE <0.10 kU/L 0.12 (H)   [place] <0.10 kU/L <0.10   D. farinae <0.10 kU/L 22.30 (H)   D. farinae Class  CLASS 4   D. pteronyssinus Dust Mite IgE <0.10 kU/L 13.60 (H)   D. pteronyssinus Class  CLASS 3   Dog Dander IgE <0.10 kU/L 44.80 (H)   Dog Dander Class  CLASS 4   Elm [place], IgE <0.10 kU/L 0.48 (H)   Elm [place] Class  CLASS 1   English Plantain IgE <0.10 kU/L <0.10   English Plantain Class  CLASS 0   Feather Panel #2 <0.70 kU/L <0.10   Christiano Grass IgE <0.10 kU/L 0.28 (H)   Christiano Grass Class  CLASS 0/1   Lamb Quarters IgE <0.10 kU/L 0.14 (H)   Allergen [place] Class  CLASS 0/1   [place] IgE <0.10 kU/L 0.10   M[place]helder Class  CLASS 0/1   Meadow Grass ([place] Blue), IgE <0.10 kU/L 0.66 (H)   Meadow Grass ([place] Blue) Class  CLASS 1   Mucor racemosus, IgE <0.10 kU/L <0.10   Mucor racemosus Class  CLASS 0   [place] Tree IgE <0.10 kU/L <0.10   Allergen [place] Class  CLASS 0   MUGWORT <0.10 kU/L <0.10   Mugwort Class  CLASS 0   NETTLE <0.10 kU/L 0.42 (H)   Nettle Class  CLASS 1   [place], Class  CLASS 3   Pecan [place] Tree IgE <0.10 kU/L 2.29 (H)   Pecan, Class  CLASS 2   Penicillium IgE <0.10 kU/L <0.10   Penicillium Class  CLASS 0   Pigweed IgE <0.10 kU/L <0.10   Common Pigweed Class  CLASS 0   Ragweed, Short, Class  CLASS 0/1   Ragweed, Short, IgE <0.10 kU/L 0.28 (H)   RAST Allergen for [place] <0.70 kU/L 0.46    Silver Birch Tree IgE <0.10 kU/L 9.35 (H)   Silver Birch Class  CLASS 3   Aditya Grass IgE <0.10 kU/L 0.36 (H)   Aditya Grass Class  CLASS 1   White Mychal Tree IgE <0.10 kU/L 1.04 (H)   White Mychal Class  CLASS 2   [place] Tree IgE <0.10 kU/L 14.50 (H)   (H): Data is abnormally high    CT sinus 7/15/2024: images interpreted by me and discussed with patient in detail today.   Impression:     Mild bilateral ethmoid sinus disease.     Bilateral crow bullosa.     Nasal septal deviation to the left.    Assessment:    ICD-10-CM ICD-9-CM    1. Nasal septal deviation  J34.2 470       2. Hypertrophy of inferior nasal turbinate  J34.3 478.0       3. Chronic allergic rhinitis  J30.9 477.9       4. Nasal obstruction  J34.89 478.19       5. Crow bullosa  J34.89 478.19           The primary encounter diagnosis was Nasal septal deviation. Diagnoses of Hypertrophy of inferior nasal turbinate, Chronic allergic rhinitis, Nasal obstruction, and Crow bullosa were also pertinent to this visit.      Plan:  No orders of the defined types were placed in this encounter.      Recommend Alin Med Sinus Rinse BID; distilled water only(maintenance).  Continue Flonase 2 sprays per nostril BID (spray laterally).  Continue Astelin 1 spray per nostril BID  Continue xyzal 5mg PO daily  Referral to allergist placed  She would benefit from septoplasty and turbinate reduction  and removal of bilateral crow bullosa for the treatment of her condition.  I discussed the risks, benefits and alternatives to surgery with the patient, as well as the expected postoperative course.  I gave her the opportunity to ask questions and I answered all of them.  I provided relevant printed information on her condition for her to review at home.  Same-day discharge is anticipated.  She will need evaluation in the pre-anesthesia clinic.   The surgery will be scheduled in the near future.  She will need to return for a postoperative visit 1 week after  surgery.    Jo Fairchild MD

## 2024-08-16 ENCOUNTER — HOSPITAL ENCOUNTER (OUTPATIENT)
Dept: PREADMISSION TESTING | Facility: HOSPITAL | Age: 19
Discharge: HOME | End: 2024-08-16
Attending: NURSE PRACTITIONER
Payer: COMMERCIAL

## 2024-08-16 ENCOUNTER — TELEPHONE (OUTPATIENT)
Dept: OTOLARYNGOLOGY | Facility: CLINIC | Age: 19
End: 2024-08-16
Payer: COMMERCIAL

## 2024-08-16 NOTE — TELEPHONE ENCOUNTER
Pt called to inform us that her mother would like to post pone surgery until December 19th due to school starting soon. Understanding was verbalized and surgery date was updated.

## 2024-08-16 NOTE — DISCHARGE INSTRUCTIONS

## 2024-08-19 ENCOUNTER — OFFICE VISIT (OUTPATIENT)
Dept: ALLERGY | Facility: CLINIC | Age: 19
End: 2024-08-19
Payer: COMMERCIAL

## 2024-08-19 VITALS — HEIGHT: 63 IN | WEIGHT: 117.75 LBS | BODY MASS INDEX: 20.86 KG/M2

## 2024-08-19 DIAGNOSIS — J30.9 CHRONIC ALLERGIC RHINITIS: Primary | Chronic | ICD-10-CM

## 2024-08-19 DIAGNOSIS — K21.9 GASTROESOPHAGEAL REFLUX DISEASE, UNSPECIFIED WHETHER ESOPHAGITIS PRESENT: ICD-10-CM

## 2024-08-19 PROCEDURE — 99204 OFFICE O/P NEW MOD 45 MIN: CPT | Mod: S$GLB,,, | Performed by: ALLERGY & IMMUNOLOGY

## 2024-08-19 PROCEDURE — 3044F HG A1C LEVEL LT 7.0%: CPT | Mod: CPTII,S$GLB,, | Performed by: ALLERGY & IMMUNOLOGY

## 2024-08-19 PROCEDURE — 3008F BODY MASS INDEX DOCD: CPT | Mod: CPTII,S$GLB,, | Performed by: ALLERGY & IMMUNOLOGY

## 2024-08-19 PROCEDURE — 99999 PR PBB SHADOW E&M-EST. PATIENT-LVL III: CPT | Mod: PBBFAC,,, | Performed by: ALLERGY & IMMUNOLOGY

## 2024-08-19 PROCEDURE — 1159F MED LIST DOCD IN RCRD: CPT | Mod: CPTII,S$GLB,, | Performed by: ALLERGY & IMMUNOLOGY

## 2024-08-19 RX ORDER — MONTELUKAST SODIUM 10 MG/1
10 TABLET ORAL NIGHTLY
Qty: 30 TABLET | Refills: 6 | Status: SHIPPED | OUTPATIENT
Start: 2024-08-19

## 2024-08-19 RX ORDER — CIPROFLOXACIN AND DEXAMETHASONE 3; 1 MG/ML; MG/ML
SUSPENSION/ DROPS AURICULAR (OTIC)
COMMUNITY
Start: 2024-02-15

## 2024-08-19 RX ORDER — OMEPRAZOLE 20 MG/1
20 CAPSULE, DELAYED RELEASE ORAL DAILY
Qty: 30 CAPSULE | Refills: 3 | Status: SHIPPED | OUTPATIENT
Start: 2024-08-19 | End: 2025-08-19

## 2024-08-19 NOTE — PROGRESS NOTES
Subjective:       Patient ID: Lupe Machuca is a 18 y.o. female.    Chief Complaint:  Allergy Testing  Allergic rhinitis    HPI:   Patient's symptoms include nasal congestion, postnasal drip, and poor sense of smell . Also w concern of frequent throat clearing after eating and drinking--worse w spicy foods. Also worse when supine. These symptoms are perennial. Current triggers include exposure to  dogs . She works a pets. The patient has been suffering from these symptoms for approximately most of life, worse in recent years. The patient has tried  flonase, astelin, multiple antihistamines  with poor relief of symptoms. Has epistaxis w both nasal sprays, even when preceded by nasal saline. Immunotherapy has never been tried. The patient has never had nasal polyps. The patient has no history of asthma. The patient has no history of eczema. The patient does not suffer from frequent sinopulmonary infections. The patient has not had sinus surgery in the past. But has repair of deviated nasal septum, turbinate reduction, and removal of B crow bullosa scheduled in Dec.  Her dogs (2) don't seem to bother her but others do.  She is in school in [place]. Only home for summer. Sx's not as bad in [place]. She plans to spend majority of next 2-3 yrs in [place] and may want to stay there after school.        Past Medical History:   Diagnosis Date    Angio-edema     Anxiety disorder, unspecified     Arm fracture     Depression     Staph infection     SVT (supraventricular tachycardia)     Urticaria          Family History   Problem Relation Name Age of Onset    No Known Problems Mother      Stroke Father      Hypertension Father      Obesity Father      ADD / ADHD Sister 2     Bipolar disorder Sister 2     Cardiomyopathy Maternal Grandmother      Arrhythmia Maternal Grandmother          HCOM and JORDAN    Heart attack Maternal Grandmother      Allergies Maternal Grandfather      Heart disease Maternal Grandfather      Heart attack Maternal  Grandfather      Heart disease Paternal Grandmother      Cancer Paternal Grandmother      Congenital heart disease Paternal Grandmother      Stroke Paternal Grandmother      Heart attack Paternal Grandmother      Stroke Paternal Grandfather      Early death Neg Hx      Heart attacks under age 50 Neg Hx      Pacemaker/defibrilator Neg Hx           Review of Systems   Constitutional:  Negative for activity change, fatigue and fever.   HENT:  Positive for congestion, postnasal drip and sinus pressure. Negative for rhinorrhea and sneezing.    Eyes:  Negative for discharge, redness and itching.   Respiratory:  Negative for cough, shortness of breath and wheezing.    Cardiovascular:  Negative for chest pain.   Gastrointestinal:  Negative for diarrhea, nausea and vomiting.   Genitourinary:  Negative for dysuria.   Musculoskeletal:  Negative for arthralgias and joint swelling.   Skin:  Negative for rash.   Neurological:  Negative for headaches.   Hematological:  Does not bruise/bleed easily.   Psychiatric/Behavioral:  Negative for behavioral problems and sleep disturbance.           Objective:   Physical Exam  Vitals and nursing note reviewed.   Constitutional:       General: She is not in acute distress.     Appearance: She is well-developed.   HENT:      Head: Normocephalic.      Right Ear: Tympanic membrane and external ear normal.      Left Ear: Tympanic membrane and external ear normal.      Nose: Congestion present. No septal deviation, mucosal edema or rhinorrhea.      Right Sinus: No maxillary sinus tenderness or frontal sinus tenderness.      Left Sinus: No maxillary sinus tenderness or frontal sinus tenderness.      Mouth/Throat:      Pharynx: Uvula midline. No uvula swelling.   Eyes:      General:         Right eye: No discharge.         Left eye: No discharge.      Conjunctiva/sclera: Conjunctivae normal.   Cardiovascular:      Rate and Rhythm: Normal rate and regular rhythm.   Pulmonary:      Effort: Pulmonary  "effort is normal. No respiratory distress.      Breath sounds: Normal breath sounds. No wheezing.   Abdominal:      General: Bowel sounds are normal.      Palpations: Abdomen is soft.      Tenderness: There is no abdominal tenderness.   Musculoskeletal:         General: No tenderness. Normal range of motion.      Cervical back: Normal range of motion and neck supple.   Lymphadenopathy:      Cervical: No cervical adenopathy.   Skin:     General: Skin is warm.      Findings: No erythema or rash.   Neurological:      Mental Status: She is alert and oriented to person, place, and time.   Psychiatric:         Behavior: Behavior normal.         Thought Content: Thought content normal.         Judgment: Judgment normal.             No results found for: "IGGSERUM", "IGM", "IGA"     No results found for: "IGE"    Eos #   Date Value Ref Range Status   06/06/2024 0.6 (H) 0.0 - 0.5 K/uL Final   09/02/2022 0.1 0.0 - 0.4 K/uL Final     Eosinophil %   Date Value Ref Range Status   06/06/2024 9.1 (H) 0.0 - 8.0 % Final   09/02/2022 0.9 0.0 - 4.0 % Final      Latest Reference Range & Units 07/12/24 09:57   A. alternata IgE <0.10 kU/L <0.10   Altern. alternata Class  CLASS 0   Aspergillus Fumigatus IgE <0.10 kU/L <0.10   A. fumigatus Class  CLASS 0   Allergen Sheep [place] (Yel Dock) IgE <0.10 kU/L <0.10   Allergen Sheep [place] ([place]) Class  CLASS 0   Bald [place] Class  CLASS 0   Bermuda Grass IgE <0.10 kU/L 0.15 (H)   [place] Grass Class  CLASS 0/1   Boxelder Maple Tree IgE <0.10 kU/L 0.62 (H)   Allergen Maple ([place]) Class  CLASS 1   Cat Dander IgE <0.10 kU/L 3.46 (H)   Cat Epithelium Class  CLASS 2   [place] IgE <0.10 kU/L <0.10   [place] Class  CLASS 0   Cladosporium Class  CLASS 0   Cladosporium IgE <0.10 kU/L <0.10   Cocklebur Class  CLASS 0/1   Cocklebur IgE <0.10 kU/L 0.12 (H)   [place]y <0.10 kU/L <0.10   D. farinae <0.10 kU/L 22.30 (H)   D. farinae Class  CLASS 4   D. pteronyssinus Dust Mite IgE <0.10 kU/L 13.60 (H)   D. " pteronyssinus Class  CLASS 3   Dog Dander IgE <0.10 kU/L 44.80 (H)   Dog Dander Class  CLASS 4   Elm [place], IgE <0.10 kU/L 0.48 (H)   Elm [place] Class  CLASS 1   English Plantain IgE <0.10 kU/L <0.10   English Plantain Class  CLASS 0   Feather Panel #2 <0.70 kU/L <0.10   Christiano Grass IgE <0.10 kU/L 0.28 (H)   [place] Grass Class  CLASS 0/1   Lamb Quarters IgE <0.10 kU/L 0.14 (H)   Allergen Mckeon's Quarters Class  CLASS 0/1   Marshelder IgE <0.10 kU/L 0.10   Marshelder Class  CLASS 0/1   Meadow Grass ([place] Blue), IgE <0.10 kU/L 0.66 (H)   Meadow Grass ([place] Blue) Class  CLASS 1   Mucor racemosus, IgE <0.10 kU/L <0.10   Mucor racemosus Class  CLASS 0   L[place]el Tree IgE <0.10 kU/L <0.10   Allergen [place] Class  CLASS 0   MUGWORT <0.10 kU/L <0.10   Mugwort Class  CLASS 0   NETTLE <0.10 kU/L 0.42 (H)   Nettle Class  CLASS 1   [place], Class  CLASS 3   Pecan [place] Tree IgE <0.10 kU/L 2.29 (H)   Pecan, Class  CLASS 2   Penicillium IgE <0.10 kU/L <0.10   Penicillium Class  CLASS 0   Pigweed IgE <0.10 kU/L <0.10   Common Pigweed Class  CLASS 0   Ragweed, Short, Class  CLASS 0/1   Ragweed, Short, IgE <0.10 kU/L 0.28 (H)   RAST Allergen for [place] <0.70 kU/L 0.46   Silver Birch Tree IgE <0.10 kU/L 9.35 (H)   Silver Birch Class  CLASS 3   Aditya Grass IgE <0.10 kU/L 0.36 (H)   Aditya Grass Class  CLASS 1   White Mychal Tree IgE <0.10 kU/L 1.04 (H)   White Mychal Class  CLASS 2   [place] Tree IgE <0.10 kU/L 14.50 (H)   (H): Data is abnormally high      Assessment:       1. Chronic allergic rhinitis    2. Gastroesophageal reflux disease, unspecified whether esophagitis present         Plan:       Lupe was seen today for allergy testing.    Diagnoses and all orders for this visit:    Chronic allergic rhinitis    -     montelukast (SINGULAIR) 10 mg tablet; Take 1 tablet (10 mg total) by mouth every evening.  Counseled re poss SE. D/c if concern of SE    Poor response to nasal steroids, oral antihistamines, intranasal  antihistamine.  May be good candidate for future immunotherapy, but since will be spending most of time in [place] during upcoming years, may best be pursued in [place].    Counseled that after surgery will still likely need some degree of medical management to control AR    Gastroesophageal reflux disease, unspecified whether esophagitis present    -     omeprazole (PRILOSEC) 20 MG capsule; Take 1 capsule (20 mg total) by mouth once daily. Take on empty stomach 30 min before eating

## 2024-08-20 RX ORDER — TRAZODONE HYDROCHLORIDE 50 MG/1
50 TABLET ORAL NIGHTLY
Qty: 30 TABLET | Refills: 0 | Status: SHIPPED | OUTPATIENT
Start: 2024-08-20

## 2024-09-02 RX ORDER — ESCITALOPRAM OXALATE 20 MG/1
20 TABLET ORAL DAILY
Qty: 90 TABLET | Refills: 0 | Status: SHIPPED | OUTPATIENT
Start: 2024-09-02 | End: 2025-09-02

## 2024-09-19 ENCOUNTER — PATIENT MESSAGE (OUTPATIENT)
Dept: PRIMARY CARE CLINIC | Facility: CLINIC | Age: 19
End: 2024-09-19
Payer: COMMERCIAL

## 2024-09-30 RX ORDER — TRAZODONE HYDROCHLORIDE 50 MG/1
50 TABLET ORAL NIGHTLY
Qty: 30 TABLET | Refills: 0 | Status: SHIPPED | OUTPATIENT
Start: 2024-09-30

## 2024-10-14 RX ORDER — TRAZODONE HYDROCHLORIDE 50 MG/1
50 TABLET ORAL NIGHTLY
Qty: 30 TABLET | Refills: 0 | Status: SHIPPED | OUTPATIENT
Start: 2024-10-14

## 2024-11-13 NOTE — TELEPHONE ENCOUNTER
----- Message from Rosi sent at 11/13/2024 11:43 AM CST -----  Type:  RX Refill Request    Who Called: [facility]   Refill or New Rx: Refill   RX Name and Strength:levocetirizine (XYZAL) 5 MG tablet   Preferred Pharmacy with phone number:[facility] - [address] - [place], [place] - [address] AT [address]  Phone: [phone]  Local or Mail Order: Local   Ordering Provider: Nico   Would the patient rather a call back or a response via [facility]?  Call   Best Call Back Number:[phone]  Additional Information:   Shortness of breath

## 2024-11-14 RX ORDER — LEVOCETIRIZINE DIHYDROCHLORIDE 5 MG/1
5 TABLET, FILM COATED ORAL NIGHTLY
Qty: 30 TABLET | Refills: 11 | Status: SHIPPED | OUTPATIENT
Start: 2024-11-14 | End: 2025-11-14

## 2024-12-05 ENCOUNTER — HOSPITAL ENCOUNTER (OUTPATIENT)
Dept: PREADMISSION TESTING | Facility: HOSPITAL | Age: 19
Discharge: HOME | End: 2024-12-05
Attending: NURSE PRACTITIONER
Payer: COMMERCIAL

## 2024-12-05 ENCOUNTER — PATIENT MESSAGE (OUTPATIENT)
Dept: PREADMISSION TESTING | Facility: HOSPITAL | Age: 19
End: 2024-12-05

## 2024-12-05 ENCOUNTER — ANESTHESIA EVENT (OUTPATIENT)
Dept: SURGERY | Facility: HOSPITAL | Age: 19
End: 2024-12-05
Payer: COMMERCIAL

## 2024-12-05 NOTE — ANESTHESIA PREPROCEDURE EVALUATION
12/05/2024  Lupe Machuca is a 18 y.o., female scheduled  SEPTOPLASTY, NOSE (Nose) with CAUTERIZATION, MUCOSA, NASAL TURBINATE (Bilateral: Nose) and ENDOSCOPY, NOSE, WITH REUBEN BULLOSA RESECTION (Bilateral) 12/19/24.    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 Stress echo WNL.     Past Medical History:   Diagnosis Date    Angio-edema     Anxiety disorder, unspecified     Arm fracture     Depression     Staph infection     SVT (supraventricular tachycardia)     Urticaria      Past Surgical History:   Procedure Laterality Date    ABLATION Bilateral 3/24/2023    Procedure: Ablation;  Surgeon: Elmer Platt MD;  Location: [facility];  Service: Cardiology;  Laterality: Bilateral;  SVT, RFA, +/- loop;  DIANE, MDT; Gen/MAC, 3prepLc     Review of patient's allergies indicates:  No Known Allergies    Current Outpatient Medications   Medication Instructions    azelastine (ASTELIN) 137 mcg, Nasal, 2 times daily    ciprofloxacin-dexAMETHasone 0.3-0.1% (CIPRODEX) 0.3-0.1 % DrpS INSTILL 4 DROPS INTO AFFECTED EAR(S) BY OTIC ROUTE 2 TIMES PER DAY FOR 7 DAYS    EScitalopram oxalate (LEXAPRO) 20 mg, Oral, Daily    fluticasone propionate (FLONASE) 100 mcg, Each Nostril, Daily    hydrOXYzine HCL (ATARAX) 25 MG tablet TAKE 1 TABLET(25 MG) BY MOUTH THREE TIMES DAILY AS NEEDED FOR ANXIETY OR SLEEP    levocetirizine (XYZAL) 5 mg, Oral, Nightly    methylPREDNISolone (MEDROL DOSEPACK) 4 mg tablet use as directed    montelukast (SINGULAIR) 10 mg, Oral, Nightly    omeprazole (PRILOSEC) 20 mg, Oral, Daily, Take on empty stomach 30 min before eating    traZODone (DESYREL) 50 mg, Oral, Nightly       Pre-op Assessment    I have reviewed the Patient Summary Reports.     I have reviewed the Nursing Notes. I have reviewed the NPO Status.   I have reviewed the Medications.   Steroids Taken In Past Year: Prednisone    Review of Systems  Anesthesia  Hx:  No problems with previous Anesthesia             Denies Family Hx of Anesthesia complications.    Denies Personal Hx of Anesthesia complications.                    Social:  Non-Smoker, No Alcohol Use       Cardiovascular:  Exercise tolerance: good   Denies Pacemaker.     Denies CABG/stent. Dysrhythmias (SVT)         Denies THORPE.   S/p ablation 3/2023                           Pulmonary:  Pulmonary Normal      Denies Shortness of breath.                  Hepatic/GI:     GERD, poorly controlled                Neurological:  Neurology Normal Denies TIA.  Denies CVA.    Denies Seizures.                                Endocrine:  Endocrine Normal Denies Diabetes.           Psych:   anxiety                 Physical Exam  General: Cooperative and Oriented    Airway:  Mallampati: III / II  Mouth Opening: Normal  TM Distance: Normal  Tongue: Normal  Neck ROM: Normal ROM    Dental:  Intact  Any loose or missing teeth verified with patient  Chest/Lungs:  Normal Respiratory Rate    Heart:  Rate: Normal      Lab Results   Component Value Date    WBC 6.47 06/06/2024    HGB 13.6 06/06/2024    HCT 43.5 06/06/2024     06/06/2024    CHOL 129 06/06/2024    TRIG 79 06/06/2024    HDL 53 06/06/2024    ALT 8 (L) 06/06/2024    AST 16 06/06/2024     06/06/2024    K 4.4 06/06/2024     06/06/2024    CREATININE 0.8 06/06/2024    BUN 8 06/06/2024    CO2 23 06/06/2024    TSH 2.336 06/06/2024    HGBA1C 5.2 06/06/2024     No results found for this or any previous visit.      Anesthesia Plan  Type of Anesthesia, risks & benefits discussed:    Anesthesia Type: Gen ETT  Intra-op Monitoring Plan: Standard ASA Monitors  Post Op Pain Control Plan: multimodal analgesia  Induction:  IV  Airway Plan: Direct, Post-Induction  Informed Consent: Informed consent signed with the Patient and all parties understand the risks and agree with anesthesia plan.  All questions answered.   ASA Score: 2  Day of Surgery Review of History &  Physical: H&P Update referred to the surgeon/provider.    Ready For Surgery From Anesthesia Perspective.     .

## 2024-12-05 NOTE — PRE-PROCEDURE INSTRUCTIONS
Mom    Allergies, medical, surgical, family and psychosocial histories reviewed with patient. Periop plan of care reviewed. Preop instructions given, including medications to take and to hold. Hibiclens soap and instructions on use given. Time allotted for questions to be addressed.      Arrival time 0530.    Please take Lexapro and Omeprazole the morning of surgery.    Surgery instructions reviewed and surgery booklet sent or emailed to the patient via the portal.

## 2024-12-05 NOTE — DISCHARGE INSTRUCTIONS

## 2024-12-18 ENCOUNTER — OFFICE VISIT (OUTPATIENT)
Dept: OTOLARYNGOLOGY | Facility: CLINIC | Age: 19
End: 2024-12-18
Payer: COMMERCIAL

## 2024-12-18 ENCOUNTER — PATIENT OUTREACH (OUTPATIENT)
Dept: ADMINISTRATIVE | Facility: HOSPITAL | Age: 19
End: 2024-12-18
Payer: COMMERCIAL

## 2024-12-18 VITALS
DIASTOLIC BLOOD PRESSURE: 82 MMHG | SYSTOLIC BLOOD PRESSURE: 126 MMHG | BODY MASS INDEX: 23.22 KG/M2 | HEART RATE: 99 BPM | WEIGHT: 132.69 LBS

## 2024-12-18 DIAGNOSIS — J34.2 NASAL SEPTAL DEVIATION: Primary | ICD-10-CM

## 2024-12-18 DIAGNOSIS — J30.9 CHRONIC ALLERGIC RHINITIS: Chronic | ICD-10-CM

## 2024-12-18 DIAGNOSIS — J34.3 HYPERTROPHY OF INFERIOR NASAL TURBINATE: Chronic | ICD-10-CM

## 2024-12-18 DIAGNOSIS — J34.89 CONCHA BULLOSA: Chronic | ICD-10-CM

## 2024-12-18 DIAGNOSIS — J34.89 NASAL OBSTRUCTION: Chronic | ICD-10-CM

## 2024-12-18 PROCEDURE — 3074F SYST BP LT 130 MM HG: CPT | Mod: CPTII,S$GLB,, | Performed by: OTOLARYNGOLOGY

## 2024-12-18 PROCEDURE — 3044F HG A1C LEVEL LT 7.0%: CPT | Mod: CPTII,S$GLB,, | Performed by: OTOLARYNGOLOGY

## 2024-12-18 PROCEDURE — 3079F DIAST BP 80-89 MM HG: CPT | Mod: CPTII,S$GLB,, | Performed by: OTOLARYNGOLOGY

## 2024-12-18 PROCEDURE — 1159F MED LIST DOCD IN RCRD: CPT | Mod: CPTII,S$GLB,, | Performed by: OTOLARYNGOLOGY

## 2024-12-18 PROCEDURE — 99999 PR PBB SHADOW E&M-EST. PATIENT-LVL IV: CPT | Mod: PBBFAC,,, | Performed by: OTOLARYNGOLOGY

## 2024-12-18 PROCEDURE — 1160F RVW MEDS BY RX/DR IN RCRD: CPT | Mod: CPTII,S$GLB,, | Performed by: OTOLARYNGOLOGY

## 2024-12-18 PROCEDURE — 3008F BODY MASS INDEX DOCD: CPT | Mod: CPTII,S$GLB,, | Performed by: OTOLARYNGOLOGY

## 2024-12-18 PROCEDURE — 99214 OFFICE O/P EST MOD 30 MIN: CPT | Mod: S$GLB,,, | Performed by: OTOLARYNGOLOGY

## 2024-12-18 NOTE — H&P (VIEW-ONLY)
Chief Complaint   Patient presents with    Follow-up   .    HPI:     Lupe Machuca is a 19 y.o. female who presents for evaluation of a several year history of nasal obstruction. She reports that she has been having blockage of her nose on both sides. She states that she feels that she can not breath through her nose when eating/drinking.  She reports that she has had decreased in sense of smell. She has been snoring as well. This problem has been gradually worsening.  She has been using zyrtec daily. She has been using brenna's vaporub to her nose nightly. She does not use sinus rinses or nasal sprays. She admits to midface pain and pressure.  She admits to rhinorrhea and postnasal drip.   She has not had sinus or nasal surgery. There is no history of sinonasal trauma. She has not had formal allergy testing. She notes that symptoms seem to be worse around dogs/cats. 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       Interval HPI 8/12/2024:  Follow up visit. Reports that she is still feeling nasal congestion/obstruction. She has been using saline, flonase, astelin, and xyzal. She does not feel that this provided much benefit. She has been able to stop brenna's nasal spray. Completed immunoCAP testing and CT scan sinus.     Interval HPI 12/18/2024:  Follow up visit. Reports symptoms are unchanged. Still with nasal congestion/obstruction. She has been using saline, flonase/Astelin and xyzal.  She has considered surgery discussed at prior visits and is interested in proceeding.       Past Medical History:   Diagnosis Date    Angio-edema     Anxiety disorder, unspecified     Arm fracture     Depression     Staph infection     SVT (supraventricular tachycardia)     Urticaria      Social History     Socioeconomic History    Marital status: Single   Tobacco Use    Smoking status: Some Days     Types: Cigarettes    Smokeless tobacco: Never   Substance and Sexual Activity    Alcohol use: Yes     Comment: occasionally     Drug use: Never    Sexual activity: Yes     Partners: Male     Birth control/protection: Condom   Social History Narrative    Lives with mom, stepdad, sister, and nephew.     2 dogs.    No smokers    In 12th grade.       Social Drivers of Health     Financial Resource Strain: Low Risk  (6/5/2024)    Overall Financial Resource Strain (CARDIA)     Difficulty of Paying Living Expenses: Not hard at all   Food Insecurity: No Food Insecurity (6/5/2024)    Hunger Vital Sign     Worried About Running Out of Food in the Last Year: Never true     Ran Out of Food in the Last Year: Never true   Physical Activity: Insufficiently Active (6/5/2024)    Exercise Vital Sign     Days of Exercise per Week: 2 days     Minutes of Exercise per Session: 20 min   Stress: Stress Concern Present (6/5/2024)    [employer] [place] [employer] - Occupational Stress Questionnaire     Feeling of Stress : Very much   Housing Stability: Unknown (6/5/2024)    Housing Stability Vital Sign     Unable to Pay for Housing in the Last Year: Patient declined     Past Surgical History:   Procedure Laterality Date    ABLATION Bilateral 3/24/2023    Procedure: Ablation;  Surgeon: Elmer Platt MD;  Location: [facility];  Service: Cardiology;  Laterality: Bilateral;  SVT, RFA, +/- loop;  DIANE, MDT; Gen/MAC, 3prepLc     Family History   Problem Relation Name Age of Onset    No Known Problems Mother      Stroke Father      Hypertension Father      Obesity Father      ADD / ADHD Sister 2     Bipolar disorder Sister 2     Cardiomyopathy Maternal Grandmother      Arrhythmia Maternal Grandmother          HCOM and JORDAN    Heart attack Maternal Grandmother      Allergies Maternal Grandfather      Heart disease Maternal Grandfather      Heart attack Maternal Grandfather      Heart disease Paternal Grandmother      Cancer Paternal Grandmother      Congenital heart disease Paternal Grandmother      Stroke Paternal Grandmother      Heart attack Paternal  Grandmother      Stroke Paternal Grandfather      Early death Neg Hx      Heart attacks under age 50 Neg Hx      Pacemaker/defibrilator Neg Hx             Review of Systems  General: negative for chills, fever or weight loss  Psychological: negative for mood changes or depression  Ophthalmic: negative for blurry vision, photophobia or eye pain  ENT: see HPI  Respiratory: no cough, shortness of breath, or wheezing  Cardiovascular: no chest pain or dyspnea on exertion  Gastrointestinal: no abdominal pain, change in bowel habits, or black/ bloody stools  Musculoskeletal: negative for gait disturbance or muscular weakness  Neurological: no syncope or seizures; no ataxia  Dermatological: negative for puritis,  rash and jaundice  Hematologic/lymphatic: no easy bruising, no new lumps or bumps      Physical Exam:    Vitals:    12/18/24 1040   BP: 126/82   Pulse: 99         Constitutional: Well appearing / communicating without difficutly.  NAD.  Eyes: EOM I Bilaterally  Head/Face: Normocephalic.  Negative paranasal sinus pressure/tenderness.  Salivary glands WNL.  House Brackmann I Bilaterally.    Right Ear: Auricle normal appearance. External Auditory Canal within normal limits,TM w/o masses/lesions/perforations. TM mobility noted.   Left Ear: Auricle normal appearance. External Auditory Canal WNL,TM w/o masses/lesions/perforations. TM mobility noted.  Nose: + severe mucosal edema; + nasal septal deviation to the left. Inferior Turbinates boggy, edematous, hypertrophic with clear rhinorrhea present  bilaterally. No septal perforation. No masses/lesions. External nasal skin appears normal without masses/lesions.  Oral Cavity: Gingiva/lips within normal limits.  Dentition/gingiva healthy appearing. Mucus membranes moist. Floor of mouth soft, no masses palpated. Oral Tongue mobile. Hard Palate appears normal.    Oropharynx: Base of tongue appears normal. No masses/lesions noted. Tonsillar fossa/pharyngeal wall without  lesions. Posterior oropharynx WNL.  Soft palate without masses. Midline uvula.   Neck/Lymphatic: No LAD I-VI bilaterally.  No thyromegaly.  No masses noted on exam.      Diagnostic testing:   ImmunoCAP:    Latest Reference Range & Units 07/12/24 09:57   A. alternata IgE <0.10 kU/L <0.10   Altern. alternata Class  CLASS 0   Aspergillus Fumigatus IgE <0.10 kU/L <0.10   A. fumigatus Class  CLASS 0   Allergen Sheep [place] ([place]) IgE <0.10 kU/L <0.10   Allergen Sheep Greasy ([place]) Class  CLASS 0   Bald Fossil Class  CLASS 0   Bermuda Grass IgE <0.10 kU/L 0.15 (H)   [place] Grass Class  CLASS 0/1   Boxelder Maple Tree IgE <0.10 kU/L 0.62 (H)   Allergen Maple ([place]) Class  CLASS 1   Cat Dander IgE <0.10 kU/L 3.46 (H)   Cat Epithelium Class  CLASS 2   [place] IgE <0.10 kU/L <0.10   [place] Class  CLASS 0   Cladosporium Class  CLASS 0   Cladosporium IgE <0.10 kU/L <0.10   Cocklebur Class  CLASS 0/1   Cocklebur IgE <0.10 kU/L 0.12 (H)   Fossil <0.10 kU/L <0.10   D. farinae <0.10 kU/L 22.30 (H)   D. farinae Class  CLASS 4   D. pteronyssinus Dust Mite IgE <0.10 kU/L 13.60 (H)   D. pteronyssinus Class  CLASS 3   Dog Dander IgE <0.10 kU/L 44.80 (H)   Dog Dander Class  CLASS 4   Elm [place], IgE <0.10 kU/L 0.48 (H)   Elm [place] Class  CLASS 1   English Plantain IgE <0.10 kU/L <0.10   English Plantain Class  CLASS 0   Feather Panel #2 <0.70 kU/L <0.10   Christiano Grass IgE <0.10 kU/L 0.28 (H)   [place] Grass Class  CLASS 0/1   Lamb Quarters IgE <0.10 kU/L 0.14 (H)   Allergen Mckeon's Quarters Class  CLASS 0/1   Marshelder IgE <0.10 kU/L 0.10   Marshelder Class  CLASS 0/1   Meadow Grass ([place] Blue), IgE <0.10 kU/L 0.66 (H)   Meadow Grass (ArchitizerEncompass Health60mo Blue) Class  CLASS 1   Mucor racemosus, IgE <0.10 kU/L <0.10   Mucor racemosus Class  CLASS 0   [place] Tree IgE <0.10 kU/L <0.10   Allergen [place] Class  CLASS 0   MUGWORT <0.10 kU/L <0.10   Mugwort Class  CLASS 0   NETTLE <0.10 kU/L 0.42 (H)   Nettle Class  CLASS 1   [place],  Class  CLASS 3   Pecan [place] Tree IgE <0.10 kU/L 2.29 (H)   Pecan, Class  CLASS 2   Penicillium IgE <0.10 kU/L <0.10   Penicillium Class  CLASS 0   Pigweed IgE <0.10 kU/L <0.10   Common Pigweed Class  CLASS 0   Ragweed, Short, Class  CLASS 0/1   Ragweed, Short, IgE <0.10 kU/L 0.28 (H)   RAST Allergen for [place] <0.70 kU/L 0.46   Silver Birch Tree IgE <0.10 kU/L 9.35 (H)   Silver Birch Class  CLASS 3   Aditya Grass IgE <0.10 kU/L 0.36 (H)   Aditya Grass Class  CLASS 1   White Mychal Tree IgE <0.10 kU/L 1.04 (H)   White Mychal Class  CLASS 2   [place] Tree IgE <0.10 kU/L 14.50 (H)   (H): Data is abnormally high    CT sinus 7/15/2024: images interpreted by me and discussed with patient in detail today.   Impression:     Mild bilateral ethmoid sinus disease.     Bilateral rcow bullosa.     Nasal septal deviation to the left.    Assessment:    ICD-10-CM ICD-9-CM    1. Nasal septal deviation  J34.2 470       2. 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 Chronic allergic rhinitis  J30.9 477.9       5. Nasal obstruction  J34.89 478.19             The primary encounter diagnosis was Nasal septal deviation. Diagnoses of Hypertrophy of inferior nasal turbinate, Crow bullosa, Chronic allergic rhinitis, and Nasal obstruction were also pertinent to this visit.      Plan:  No orders of the defined types were placed in this encounter.      Recommend Alin Med Sinus Rinse BID; distilled water only(maintenance).  Continue Flonase 2 sprays per nostril BID (spray laterally).  Continue Astelin 1 spray per nostril BID  Continue xyzal 5mg PO daily  Referral to allergist placed  She would benefit from septoplasty and turbinate reduction  and removal of bilateral crow bullosa for the treatment of her condition.  I discussed the risks, benefits and alternatives to surgery with the patient, as well as the expected postoperative course.  I gave her the opportunity to ask  questions and I answered all of them.  Written informed consent/ I provided relevant printed information on her condition and post-operative instructions for her to review at home.  Same-day discharge is anticipated.  She will need evaluation in the pre-anesthesia clinic.   The surgery will be tentatively scheduled for 12/19/2024.  She will need to return for a postoperative visit 1 week after surgery.    Jo Fairchild MD

## 2024-12-18 NOTE — PATIENT INSTRUCTIONS
INSTRUCTIONS TO FOLLOW AFTER SINUS SURGERY  DR. Tauzin - [facility]      Your first office visit with Dr. Walsh after surgery should have been already scheduled.  If you dont know when it is, call Dr. Walsh's nurse office at  [phone].    ACTIVITY:    Sleep on your back with the head of the bead elevated, up on 2-3 pillows, or in a recliner for the first 3 to 5 days. This will help with swelling.     After surgery you may have a lot of nasal drainage. This is normal. Do not wipe, touch, or dab your nose. You may breathe through your nose if youre able but avoid inhaling forcibly. Let all drainage fall on your mustache dressing and change it as needed.    You may shower 24 hours after surgery.    If you use CPAP or BiPAP to sleep at night, you should wait at least 48 hours before resuming use.  Dr. Walsh will advise you when it is safe to do this.    DRESSINGS:    Change the mustache dressing under your nose as needed. (If unsure what this dressing is or how to do this, ask your doctor or nurse before you leave the clinic/hospital.) You may have pinkish-red drainage for 2-3 days.    In certain cases you may have gauze packing inside your nostrils.  If so, these will turn from white to red from the drainage. This is normal so do not be alarmed. Do not touch or pull at the packing. The packing will be removed by your doctor at your first post-op visit.     You may also have a dissolvable stent or dissolvable sponge placed into the sinuses during surgery.  These usually do not need to be removed unless you are told otherwise by Dr. Walsh.  You may notice small fragments of these items come out of your nose in the weeks following surgery.    MEDICATIONS:  After surgery, you are generally sent home with prescription for an antibiotic, a pain medication, and an anti-nausea medication.     Start your antibiotics when you get home from surgery and take them until they are all gone.  It is best to take them with  food to prevent an upset stomach.    Most people need prescription pain medication for the first few days after surgery.  If you find that this is not necessary, you may use over-the-counter Tylenol (Acetaminophen) instead.    Avoid Aspirin, Motrin (Ibuprofen), Advil, Aleve and Vitamin E for 1 week before surgery and 1 week after surgery. There are many other medications to avoid as well due to the fact they act as blood thinners.      Some people have problems with bowel movements after surgery. If you have NOT had a bowel movement 3-5 days after surgery, go to your local pharmacy and purchase an over the counter stool softener such as COLACE. You can also ask the pharmacist for his or her recommendation. If you still do not have a bowel movement after starting the softener, please call Dr. Burnette office.    You will need these over-the-counter medications after surgery:    Saline Sinus Rinse (Alin Med brand):  You will use this to rinse out your nose and sinuses after surgery.  Begin doing this two days after surgery, unless instructed otherwise by Dr. Walsh.  You should do this 2 times a day, following the instructions on the box.    Afrin (regular strength): Only use if you have nasal congestion or bleeding. Use 2 times per day for 3 days, stop for 1 day, continue 2 times per day for 3 days, then stop completely.  NOTE:  You may not need to do this at all.      RESTRICTED ACTIVITIES AFTER SURGERY:    Do NOT blow your nose for 2 weeks. If you have to sneeze or cough, do so with your mouth open.   AVOID all heavy lifting, straining or bending for 2 weeks.   AVOID any sexual activity for 2 weeks after surgery.  AVOID semi-contact sports or vigorous exercising for 3-4 weeks. Dr. Walsh will let you know when you are cleared to resume exercise.  No Swimming for 3-4 weeks.  No Flying for 2 weeks.    Do NOT operate a motor vehicle or any type of heavy machinery within 24 hours of taking pain medication.  DO NOT  smoke or be around smokers.  AVOID irritating substances such as dust, chalk, harsh chemicals, and allergic triggers.    DIET:    Avoid hot and spicy foods, or salty soups for 1 week after surgery.  Begin with bland foods the day after surgery and advance to your regular diet as tolerated.  It is not necessary to take only soft food unless you are recovering from tonsil surgery.  Drink plenty of fluids (water is best).   Avoid alcoholic and caffeinated beverages for 1 week after surgery.

## 2024-12-18 NOTE — PROGRESS NOTES
Chief Complaint   Patient presents with    Follow-up   .    HPI:     Lupe Machuca is a 19 y.o. female who presents for evaluation of a several year history of nasal obstruction. She reports that she has been having blockage of her nose on both sides. She states that she feels that she can not breath through her nose when eating/drinking.  She reports that she has had decreased in sense of smell. She has been snoring as well. This problem has been gradually worsening.  She has been using zyrtec daily. She has been using brenna's vaporub to her nose nightly. She does not use sinus rinses or nasal sprays. She admits to midface pain and pressure.  She admits to rhinorrhea and postnasal drip.   She has not had sinus or nasal surgery. There is no history of sinonasal trauma. She has not had formal allergy testing. She notes that symptoms seem to be worse around dogs/cats. 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       Interval HPI 8/12/2024:  Follow up visit. Reports that she is still feeling nasal congestion/obstruction. She has been using saline, flonase, astelin, and xyzal. She does not feel that this provided much benefit. She has been able to stop brenna's nasal spray. Completed immunoCAP testing and CT scan sinus.     Interval HPI 12/18/2024:  Follow up visit. Reports symptoms are unchanged. Still with nasal congestion/obstruction. She has been using saline, flonase/Astelin and xyzal.  She has considered surgery discussed at prior visits and is interested in proceeding.       Past Medical History:   Diagnosis Date    Angio-edema     Anxiety disorder, unspecified     Arm fracture     Depression     Staph infection     SVT (supraventricular tachycardia)     Urticaria      Social History     Socioeconomic History    Marital status: Single   Tobacco Use    Smoking status: Some Days     Types: Cigarettes    Smokeless tobacco: Never   Substance and Sexual Activity    Alcohol use: Yes     Comment: occasionally     Drug use: Never    Sexual activity: Yes     Partners: Male     Birth control/protection: Condom   Social History Narrative    Lives with mom, stepdad, sister, and nephew.     2 dogs.    No smokers    In 12th grade.       Social Drivers of Health     Financial Resource Strain: Low Risk  (6/5/2024)    Overall Financial Resource Strain (CARDIA)     Difficulty of Paying Living Expenses: Not hard at all   Food Insecurity: No Food Insecurity (6/5/2024)    Hunger Vital Sign     Worried About Running Out of Food in the Last Year: Never true     Ran Out of Food in the Last Year: Never true   Physical Activity: Insufficiently Active (6/5/2024)    Exercise Vital Sign     Days of Exercise per Week: 2 days     Minutes of Exercise per Session: 20 min   Stress: Stress Concern Present (6/5/2024)    [employer] - Occupational Stress Questionnaire     Feeling of Stress : Very much   Housing Stability: Unknown (6/5/2024)    Housing Stability Vital Sign     Unable to Pay for Housing in the Last Year: Patient declined     Past Surgical History:   Procedure Laterality Date    ABLATION Bilateral 3/24/2023    Procedure: Ablation;  Surgeon: Elmer Platt MD;  Location: [facility];  Service: Cardiology;  Laterality: Bilateral;  SVT, RFA, +/- loop;  DIANE, MDT; Gen/MAC, 3prepLc     Family History   Problem Relation Name Age of Onset    No Known Problems Mother      Stroke Father      Hypertension Father      Obesity Father      ADD / ADHD Sister 2     Bipolar disorder Sister 2     Cardiomyopathy Maternal Grandmother      Arrhythmia Maternal Grandmother          HCOM and JORDAN    Heart attack Maternal Grandmother      Allergies Maternal Grandfather      Heart disease Maternal Grandfather      Heart attack Maternal Grandfather      Heart disease Paternal Grandmother      Cancer Paternal Grandmother      Congenital heart disease Paternal Grandmother      Stroke Paternal Grandmother      Heart attack Paternal  Grandmother      Stroke Paternal Grandfather      Early death Neg Hx      Heart attacks under age 50 Neg Hx      Pacemaker/defibrilator Neg Hx             Review of Systems  General: negative for chills, fever or weight loss  Psychological: negative for mood changes or depression  Ophthalmic: negative for blurry vision, photophobia or eye pain  ENT: see HPI  Respiratory: no cough, shortness of breath, or wheezing  Cardiovascular: no chest pain or dyspnea on exertion  Gastrointestinal: no abdominal pain, change in bowel habits, or black/ bloody stools  Musculoskeletal: negative for gait disturbance or muscular weakness  Neurological: no syncope or seizures; no ataxia  Dermatological: negative for puritis,  rash and jaundice  Hematologic/lymphatic: no easy bruising, no new lumps or bumps      Physical Exam:    Vitals:    12/18/24 1040   BP: 126/82   Pulse: 99         Constitutional: Well appearing / communicating without difficutly.  NAD.  Eyes: EOM I Bilaterally  Head/Face: Normocephalic.  Negative paranasal sinus pressure/tenderness.  Salivary glands WNL.  House Brackmann I Bilaterally.    Right Ear: Auricle normal appearance. External Auditory Canal within normal limits,TM w/o masses/lesions/perforations. TM mobility noted.   Left Ear: Auricle normal appearance. External Auditory Canal WNL,TM w/o masses/lesions/perforations. TM mobility noted.  Nose: + severe mucosal edema; + nasal septal deviation to the left. Inferior Turbinates boggy, edematous, hypertrophic with clear rhinorrhea present  bilaterally. No septal perforation. No masses/lesions. External nasal skin appears normal without masses/lesions.  Oral Cavity: Gingiva/lips within normal limits.  Dentition/gingiva healthy appearing. Mucus membranes moist. Floor of mouth soft, no masses palpated. Oral Tongue mobile. Hard Palate appears normal.    Oropharynx: Base of tongue appears normal. No masses/lesions noted. Tonsillar fossa/pharyngeal wall without  lesions. Posterior oropharynx WNL.  Soft palate without masses. Midline uvula.   Neck/Lymphatic: No LAD I-VI bilaterally.  No thyromegaly.  No masses noted on exam.      Diagnostic testing:   ImmunoCAP:    Latest Reference Range & Units 07/12/24 09:57   A. alternata IgE <0.10 kU/L <0.10   Altern. alternata Class  CLASS 0   Aspergillus Fumigatus IgE <0.10 kU/L <0.10   A. fumigatus Class  CLASS 0   Allergen Sheep [place]ia ([place]) IgE <0.10 kU/L <0.10   Allergen Sheep M[place]uleia ([place]) Class  CLASS 0   Bald [place] Class  CLASS 0   Bermuda Grass IgE <0.10 kU/L 0.15 (H)   Bermuda Grass Class  CLASS 0/1   Boxelder Maple Tree IgE <0.10 kU/L 0.62 (H)   Allergen Maple ([place]) Class  CLASS 1   Cat Dander IgE <0.10 kU/L 3.46 (H)   Cat Epithelium Class  CLASS 2   [place] IgE <0.10 kU/L <0.10   [place] Class  CLASS 0   Cladosporium Class  CLASS 0   Cladosporium IgE <0.10 kU/L <0.10   Cocklebur Class  CLASS 0/1   Cocklebur IgE <0.10 kU/L 0.12 (H)   [place] <0.10 kU/L <0.10   D. farinae <0.10 kU/L 22.30 (H)   D. farinae Class  CLASS 4   D. pteronyssinus Dust Mite IgE <0.10 kU/L 13.60 (H)   D. pteronyssinus Class  CLASS 3   Dog Dander IgE <0.10 kU/L 44.80 (H)   Dog Dander Class  CLASS 4   Elm [place], IgE <0.10 kU/L 0.48 (H)   Elm [place] Class  CLASS 1   English Plantain IgE <0.10 kU/L <0.10   English Plantain Class  CLASS 0   Feather Panel #2 <0.70 kU/L <0.10   Christiano Grass IgE <0.10 kU/L 0.28 (H)   Christiano Grass Class  CLASS 0/1   Lamb Quarters IgE <0.10 kU/L 0.14 (H)   Allergen Mckeon's Quarters Class  CLASS 0/1   Marshelder IgE <0.10 kU/L 0.10   Marshelder Class  CLASS 0/1   Meadow Grass (iScreen Visiondakotay Blue), IgE <0.10 kU/L 0.66 (H)   Meadow Grass (iScreen VisionKirkbride CenterVerticalResponse Blue) Class  CLASS 1   Mucor racemosus, IgE <0.10 kU/L <0.10   Mucor racemosus Class  CLASS 0   Hawthorne Tree IgE <0.10 kU/L <0.10   Allergen Hawthorne Class  CLASS 0   MUGWORT <0.10 kU/L <0.10   Mugwort Class  CLASS 0   NETTLE <0.10 kU/L 0.42 (H)   Nettle Class  CLASS 1   [place],  Class  CLASS 3   Pecan Bridger Tree IgE <0.10 kU/L 2.29 (H)   Pecan, Class  CLASS 2   Penicillium IgE <0.10 kU/L <0.10   Penicillium Class  CLASS 0   Pigweed IgE <0.10 kU/L <0.10   Common Pigweed Class  CLASS 0   Ragweed, Short, Class  CLASS 0/1   Ragweed, Short, IgE <0.10 kU/L 0.28 (H)   RAST Allergen for Eastern [place] <0.70 kU/L 0.46   Silver Birch Tree IgE <0.10 kU/L 9.35 (H)   Silver Birch Class  CLASS 3   Aditya Grass IgE <0.10 kU/L 0.36 (H)   Aditya Grass Class  CLASS 1   White Mychal Tree IgE <0.10 kU/L 1.04 (H)   White Mychal Class  CLASS 2   [place] Tree IgE <0.10 kU/L 14.50 (H)   (H): Data is abnormally high    CT sinus 7/15/2024: images interpreted by me and discussed with patient in detail today.   Impression:     Mild bilateral ethmoid sinus disease.     Bilateral crow bullosa.     Nasal septal deviation to the left.    Assessment:    ICD-10-CM ICD-9-CM    1. Nasal septal deviation  J34.2 470       2. 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 Chronic allergic rhinitis  J30.9 477.9       5. Nasal obstruction  J34.89 478.19             The primary encounter diagnosis was Nasal septal deviation. Diagnoses of Hypertrophy of inferior nasal turbinate, Crow bullosa, Chronic allergic rhinitis, and Nasal obstruction were also pertinent to this visit.      Plan:  No orders of the defined types were placed in this encounter.      Recommend Alin Med Sinus Rinse BID; distilled water only(maintenance).  Continue Flonase 2 sprays per nostril BID (spray laterally).  Continue Astelin 1 spray per nostril BID  Continue xyzal 5mg PO daily  Referral to allergist placed  She would benefit from septoplasty and turbinate reduction  and removal of bilateral crow bullosa for the treatment of her condition.  I discussed the risks, benefits and alternatives to surgery with the patient, as well as the expected postoperative course.  I gave her the opportunity to ask  questions and I answered all of them.  Written informed consent/ I provided relevant printed information on her condition and post-operative instructions for her to review at home.  Same-day discharge is anticipated.  She will need evaluation in the pre-anesthesia clinic.   The surgery will be tentatively scheduled for 12/19/2024.  She will need to return for a postoperative visit 1 week after surgery.    Jo Fairchild MD

## 2024-12-19 ENCOUNTER — HOSPITAL ENCOUNTER (OUTPATIENT)
Facility: HOSPITAL | Age: 19
Discharge: HOME | End: 2024-12-19
Attending: OTOLARYNGOLOGY | Admitting: OTOLARYNGOLOGY
Payer: COMMERCIAL

## 2024-12-19 ENCOUNTER — ANESTHESIA (OUTPATIENT)
Dept: SURGERY | Facility: HOSPITAL | Age: 19
End: 2024-12-19
Payer: COMMERCIAL

## 2024-12-19 VITALS
TEMPERATURE: 98 F | DIASTOLIC BLOOD PRESSURE: 59 MMHG | RESPIRATION RATE: 17 BRPM | BODY MASS INDEX: 24.29 KG/M2 | HEART RATE: 80 BPM | SYSTOLIC BLOOD PRESSURE: 102 MMHG | WEIGHT: 132 LBS | HEIGHT: 62 IN | OXYGEN SATURATION: 96 %

## 2024-12-19 DIAGNOSIS — J34.2 NASAL SEPTAL DEVIATION: Primary | ICD-10-CM

## 2024-12-19 LAB
B-HCG UR QL: NEGATIVE
CTP QC/QA: YES

## 2024-12-19 PROCEDURE — 25000003 PHARM REV CODE 250: Performed by: NURSE ANESTHETIST, CERTIFIED REGISTERED

## 2024-12-19 PROCEDURE — 71000033 HC RECOVERY, INTIAL HOUR: Performed by: OTOLARYNGOLOGY

## 2024-12-19 PROCEDURE — 37000008 HC ANESTHESIA 1ST 15 MINUTES: Performed by: OTOLARYNGOLOGY

## 2024-12-19 PROCEDURE — 36000708 HC OR TIME LEV III 1ST 15 MIN: Performed by: OTOLARYNGOLOGY

## 2024-12-19 PROCEDURE — 36000709 HC OR TIME LEV III EA ADD 15 MIN: Performed by: OTOLARYNGOLOGY

## 2024-12-19 PROCEDURE — 30140 RESECT INFERIOR TURBINATE: CPT | Mod: 50,51,, | Performed by: OTOLARYNGOLOGY

## 2024-12-19 PROCEDURE — 25000003 PHARM REV CODE 250: Performed by: OTOLARYNGOLOGY

## 2024-12-19 PROCEDURE — 63600175 PHARM REV CODE 636 W HCPCS: Performed by: OTOLARYNGOLOGY

## 2024-12-19 PROCEDURE — 71000016 HC POSTOP RECOV ADDL HR: Performed by: OTOLARYNGOLOGY

## 2024-12-19 PROCEDURE — 25000003 PHARM REV CODE 250

## 2024-12-19 PROCEDURE — 63600175 PHARM REV CODE 636 W HCPCS: Performed by: NURSE ANESTHETIST, CERTIFIED REGISTERED

## 2024-12-19 PROCEDURE — 31240 NSL/SNS NDSC CNCH BULL RESCJ: CPT | Mod: 50,51,, | Performed by: OTOLARYNGOLOGY

## 2024-12-19 PROCEDURE — 25000003 PHARM REV CODE 250: Performed by: STUDENT IN AN ORGANIZED HEALTH CARE EDUCATION/TRAINING PROGRAM

## 2024-12-19 PROCEDURE — 71000015 HC POSTOP RECOV 1ST HR: Performed by: OTOLARYNGOLOGY

## 2024-12-19 PROCEDURE — 30520 REPAIR OF NASAL SEPTUM: CPT | Mod: ,,, | Performed by: OTOLARYNGOLOGY

## 2024-12-19 PROCEDURE — 37000009 HC ANESTHESIA EA ADD 15 MINS: Performed by: OTOLARYNGOLOGY

## 2024-12-19 PROCEDURE — C1763 CONN TISS, NON-HUMAN: HCPCS | Performed by: OTOLARYNGOLOGY

## 2024-12-19 PROCEDURE — 27201423 OPTIME MED/SURG SUP & DEVICES STERILE SUPPLY: Performed by: OTOLARYNGOLOGY

## 2024-12-19 PROCEDURE — 63600175 PHARM REV CODE 636 W HCPCS: Performed by: STUDENT IN AN ORGANIZED HEALTH CARE EDUCATION/TRAINING PROGRAM

## 2024-12-19 RX ORDER — OXYCODONE HYDROCHLORIDE 5 MG/1
5 TABLET ORAL
Status: DISCONTINUED | OUTPATIENT
Start: 2024-12-19 | End: 2024-12-19 | Stop reason: HOSPADM

## 2024-12-19 RX ORDER — DEXMEDETOMIDINE HYDROCHLORIDE 100 UG/ML
INJECTION, SOLUTION INTRAVENOUS
Status: DISCONTINUED | OUTPATIENT
Start: 2024-12-19 | End: 2024-12-19

## 2024-12-19 RX ORDER — DEXAMETHASONE SODIUM PHOSPHATE 4 MG/ML
INJECTION, SOLUTION INTRA-ARTICULAR; INTRALESIONAL; INTRAMUSCULAR; INTRAVENOUS; SOFT TISSUE
Status: DISCONTINUED | OUTPATIENT
Start: 2024-12-19 | End: 2024-12-19

## 2024-12-19 RX ORDER — OXYMETAZOLINE HCL 0.05 %
2 SPRAY, NON-AEROSOL (ML) NASAL ONCE
Status: COMPLETED | OUTPATIENT
Start: 2024-12-19 | End: 2024-12-19

## 2024-12-19 RX ORDER — CEFDINIR 300 MG/1
300 CAPSULE ORAL 2 TIMES DAILY
Qty: 20 CAPSULE | Refills: 0 | Status: SHIPPED | OUTPATIENT
Start: 2024-12-19 | End: 2024-12-29

## 2024-12-19 RX ORDER — EPHEDRINE SULFATE 50 MG/ML
INJECTION, SOLUTION INTRAVENOUS
Status: DISCONTINUED | OUTPATIENT
Start: 2024-12-19 | End: 2024-12-19

## 2024-12-19 RX ORDER — LIDOCAINE HYDROCHLORIDE 10 MG/ML
1 INJECTION, SOLUTION EPIDURAL; INFILTRATION; INTRACAUDAL; PERINEURAL ONCE
Status: DISCONTINUED | OUTPATIENT
Start: 2024-12-19 | End: 2024-12-19 | Stop reason: HOSPADM

## 2024-12-19 RX ORDER — TRAZODONE HYDROCHLORIDE 50 MG/1
50 TABLET ORAL NIGHTLY
Qty: 30 TABLET | Refills: 0 | Status: SHIPPED | OUTPATIENT
Start: 2024-12-19

## 2024-12-19 RX ORDER — OXYMETAZOLINE HCL 0.05 %
SPRAY, NON-AEROSOL (ML) NASAL
Status: DISCONTINUED | OUTPATIENT
Start: 2024-12-19 | End: 2024-12-19 | Stop reason: HOSPADM

## 2024-12-19 RX ORDER — LIDOCAINE HYDROCHLORIDE 20 MG/ML
INJECTION INTRAVENOUS
Status: DISCONTINUED | OUTPATIENT
Start: 2024-12-19 | End: 2024-12-19

## 2024-12-19 RX ORDER — PHENYLEPHRINE HYDROCHLORIDE 10 MG/ML
INJECTION INTRAVENOUS
Status: DISCONTINUED | OUTPATIENT
Start: 2024-12-19 | End: 2024-12-19

## 2024-12-19 RX ORDER — MUPIROCIN 20 MG/G
OINTMENT TOPICAL
Status: DISCONTINUED | OUTPATIENT
Start: 2024-12-19 | End: 2024-12-19 | Stop reason: HOSPADM

## 2024-12-19 RX ORDER — ACETAMINOPHEN 10 MG/ML
INJECTION, SOLUTION INTRAVENOUS
Status: DISCONTINUED | OUTPATIENT
Start: 2024-12-19 | End: 2024-12-19

## 2024-12-19 RX ORDER — MIDAZOLAM HYDROCHLORIDE 1 MG/ML
INJECTION INTRAMUSCULAR; INTRAVENOUS
Status: DISCONTINUED | OUTPATIENT
Start: 2024-12-19 | End: 2024-12-19

## 2024-12-19 RX ORDER — HYDROCODONE BITARTRATE AND ACETAMINOPHEN 10; 325 MG/1; MG/1
1 TABLET ORAL EVERY 4 HOURS PRN
Status: DISCONTINUED | OUTPATIENT
Start: 2024-12-19 | End: 2024-12-19 | Stop reason: HOSPADM

## 2024-12-19 RX ORDER — HYDROCODONE BITARTRATE AND ACETAMINOPHEN 5; 325 MG/1; MG/1
1 TABLET ORAL EVERY 4 HOURS PRN
Status: DISCONTINUED | OUTPATIENT
Start: 2024-12-19 | End: 2024-12-19 | Stop reason: HOSPADM

## 2024-12-19 RX ORDER — ONDANSETRON 8 MG/1
8 TABLET, ORALLY DISINTEGRATING ORAL ONCE
Status: DISCONTINUED | OUTPATIENT
Start: 2024-12-19 | End: 2024-12-19 | Stop reason: HOSPADM

## 2024-12-19 RX ORDER — HYDROCODONE BITARTRATE AND ACETAMINOPHEN 7.5; 325 MG/1; MG/1
1 TABLET ORAL EVERY 6 HOURS PRN
Qty: 20 TABLET | Refills: 0 | Status: SHIPPED | OUTPATIENT
Start: 2024-12-19

## 2024-12-19 RX ORDER — SUCCINYLCHOLINE CHLORIDE 20 MG/ML
INJECTION INTRAMUSCULAR; INTRAVENOUS
Status: DISCONTINUED | OUTPATIENT
Start: 2024-12-19 | End: 2024-12-19

## 2024-12-19 RX ORDER — FENTANYL CITRATE 50 UG/ML
INJECTION, SOLUTION INTRAMUSCULAR; INTRAVENOUS
Status: DISCONTINUED | OUTPATIENT
Start: 2024-12-19 | End: 2024-12-19

## 2024-12-19 RX ORDER — ONDANSETRON HYDROCHLORIDE 2 MG/ML
INJECTION, SOLUTION INTRAVENOUS
Status: DISCONTINUED | OUTPATIENT
Start: 2024-12-19 | End: 2024-12-19

## 2024-12-19 RX ORDER — GLUCAGON 1 MG
1 KIT INJECTION
Status: DISCONTINUED | OUTPATIENT
Start: 2024-12-19 | End: 2024-12-19 | Stop reason: HOSPADM

## 2024-12-19 RX ORDER — PROPOFOL 10 MG/ML
VIAL (ML) INTRAVENOUS
Status: DISCONTINUED | OUTPATIENT
Start: 2024-12-19 | End: 2024-12-19

## 2024-12-19 RX ORDER — HYDROMORPHONE HYDROCHLORIDE 2 MG/ML
0.2 INJECTION, SOLUTION INTRAMUSCULAR; INTRAVENOUS; SUBCUTANEOUS EVERY 5 MIN PRN
Status: DISCONTINUED | OUTPATIENT
Start: 2024-12-19 | End: 2024-12-19 | Stop reason: HOSPADM

## 2024-12-19 RX ORDER — CEFAZOLIN SODIUM 1 G/3ML
INJECTION, POWDER, FOR SOLUTION INTRAMUSCULAR; INTRAVENOUS
Status: DISCONTINUED | OUTPATIENT
Start: 2024-12-19 | End: 2024-12-19

## 2024-12-19 RX ORDER — ONDANSETRON 4 MG/1
4 TABLET, ORALLY DISINTEGRATING ORAL EVERY 8 HOURS PRN
Qty: 12 TABLET | Refills: 0 | Status: SHIPPED | OUTPATIENT
Start: 2024-12-19

## 2024-12-19 RX ORDER — CEFAZOLIN 2 G/1
2 INJECTION, POWDER, FOR SOLUTION INTRAMUSCULAR; INTRAVENOUS
Status: DISCONTINUED | OUTPATIENT
Start: 2024-12-19 | End: 2024-12-19 | Stop reason: HOSPADM

## 2024-12-19 RX ORDER — ROCURONIUM BROMIDE 10 MG/ML
INJECTION, SOLUTION INTRAVENOUS
Status: DISCONTINUED | OUTPATIENT
Start: 2024-12-19 | End: 2024-12-19

## 2024-12-19 RX ORDER — PROCHLORPERAZINE EDISYLATE 5 MG/ML
5 INJECTION INTRAMUSCULAR; INTRAVENOUS EVERY 30 MIN PRN
Status: DISCONTINUED | OUTPATIENT
Start: 2024-12-19 | End: 2024-12-19 | Stop reason: HOSPADM

## 2024-12-19 RX ADMIN — OXYMETAZOLINE HYDROCHLORIDE 2 SPRAY: 0.5 SPRAY NASAL at 06:12

## 2024-12-19 RX ADMIN — HYDROMORPHONE HYDROCHLORIDE 0.2 MG: 2 INJECTION INTRAMUSCULAR; INTRAVENOUS; SUBCUTANEOUS at 09:12

## 2024-12-19 RX ADMIN — PROCHLORPERAZINE EDISYLATE 5 MG: 5 INJECTION INTRAMUSCULAR; INTRAVENOUS at 09:12

## 2024-12-19 RX ADMIN — FENTANYL CITRATE 50 MCG: 50 INJECTION INTRAMUSCULAR; INTRAVENOUS at 07:12

## 2024-12-19 RX ADMIN — PHENYLEPHRINE HYDROCHLORIDE 100 MCG: 10 INJECTION INTRAVENOUS at 07:12

## 2024-12-19 RX ADMIN — ROCURONIUM BROMIDE 10 MG: 10 INJECTION, SOLUTION INTRAVENOUS at 07:12

## 2024-12-19 RX ADMIN — ACETAMINOPHEN 1000 MG: 10 INJECTION, SOLUTION INTRAVENOUS at 07:12

## 2024-12-19 RX ADMIN — ONDANSETRON 4 MG: 2 INJECTION, SOLUTION INTRAMUSCULAR; INTRAVENOUS at 08:12

## 2024-12-19 RX ADMIN — SODIUM CHLORIDE: 0.9 INJECTION, SOLUTION INTRAVENOUS at 07:12

## 2024-12-19 RX ADMIN — PHENYLEPHRINE HYDROCHLORIDE 50 MCG: 10 INJECTION INTRAVENOUS at 07:12

## 2024-12-19 RX ADMIN — OXYCODONE 5 MG: 5 TABLET ORAL at 09:12

## 2024-12-19 RX ADMIN — LIDOCAINE HYDROCHLORIDE 100 MG: 20 INJECTION, SOLUTION INTRAVENOUS at 07:12

## 2024-12-19 RX ADMIN — MIDAZOLAM HYDROCHLORIDE 2 MG: 1 INJECTION, SOLUTION INTRAMUSCULAR; INTRAVENOUS at 07:12

## 2024-12-19 RX ADMIN — FENTANYL CITRATE 100 MCG: 50 INJECTION INTRAMUSCULAR; INTRAVENOUS at 07:12

## 2024-12-19 RX ADMIN — CEFAZOLIN 2 G: 330 INJECTION, POWDER, FOR SOLUTION INTRAMUSCULAR; INTRAVENOUS at 07:12

## 2024-12-19 RX ADMIN — SUCCINYLCHOLINE CHLORIDE 100 MG: 20 INJECTION, SOLUTION INTRAMUSCULAR; INTRAVENOUS at 07:12

## 2024-12-19 RX ADMIN — SUGAMMADEX 200 MG: 100 INJECTION, SOLUTION INTRAVENOUS at 08:12

## 2024-12-19 RX ADMIN — EPHEDRINE SULFATE 5 MG: 50 INJECTION, SOLUTION INTRAMUSCULAR; INTRAVENOUS; SUBCUTANEOUS at 07:12

## 2024-12-19 RX ADMIN — DEXMEDETOMIDINE 6 MCG: 200 INJECTION, SOLUTION INTRAVENOUS at 08:12

## 2024-12-19 RX ADMIN — PROPOFOL 140 MG: 10 INJECTION, EMULSION INTRAVENOUS at 07:12

## 2024-12-19 RX ADMIN — DEXAMETHASONE SODIUM PHOSPHATE 12 MG: 4 INJECTION, SOLUTION INTRA-ARTICULAR; INTRALESIONAL; INTRAMUSCULAR; INTRAVENOUS; SOFT TISSUE at 07:12

## 2024-12-19 RX ADMIN — ONDANSETRON 4 MG: 2 INJECTION, SOLUTION INTRAMUSCULAR; INTRAVENOUS at 07:12

## 2024-12-19 RX ADMIN — PHENYLEPHRINE HYDROCHLORIDE 0.2 MCG/KG/MIN: 10 INJECTION INTRAVENOUS at 08:12

## 2024-12-19 RX ADMIN — ROCURONIUM BROMIDE 50 MG: 10 INJECTION, SOLUTION INTRAVENOUS at 07:12

## 2024-12-19 NOTE — DISCHARGE SUMMARY
[facility] - Surgery (Hospital)  Discharge Note  Short Stay    Procedure(s) (LRB):  SEPTOPLASTY, NOSE (N/A)  CAUTERIZATION, MUCOSA, NASAL TURBINATE (Bilateral)  ENDOSCOPY, NOSE, WITH REUBEN BULLOSA RESECTION (Bilateral)      OUTCOME: Patient tolerated treatment/procedure well without complication and is now ready for discharge.    DISPOSITION: Home or Self Care    FINAL DIAGNOSIS:  Nasal septal deviation, bilateral reuben bullosa, inferior turbinate hypertrophy, nasal obstruction    FOLLOWUP: In clinic    DISCHARGE INSTRUCTIONS:    Discharge Procedure Orders   Diet general     Change dressing (specify)   Order Comments: Change dressing prn saturation.     Call MD for:  temperature >100.4     Call MD for:  persistent nausea and vomiting     Call MD for:  severe uncontrolled pain     Call MD for:  difficulty breathing, headache or visual disturbances

## 2024-12-19 NOTE — PLAN OF CARE
Patient oob and dressed, vss, all instructions given, discharged in WC safely to car, nausea and pain under control

## 2024-12-19 NOTE — ANESTHESIA PROCEDURE NOTES
Intubation    Date/Time: 12/19/2024 7:14 AM    Performed by: Pari Faye CRNA  Authorized by: Sher Martin MD    Intubation:     Induction:  Intravenous    Intubated:  Postinduction    Mask Ventilation:  Easy mask    Attempts:  1    Attempted By:  Student    Method of Intubation:  Video laryngoscopy    Blade:  Ramirez 3    Laryngeal View Grade: Grade I - full view of cords      Difficult Airway Encountered?: No      Complications:  None    Airway Device:  Oral jeanette    Airway Device Size:  7.0    Style/Cuff Inflation:  Cuffed (inflated to minimal occlusive pressure)    Secured at:  The lips    Placement Verified By:  Capnometry    Complicating Factors:  None    Findings Post-Intubation:  BS equal bilateral and atraumatic/condition of teeth unchanged

## 2024-12-19 NOTE — OP NOTE
DATE OF OPERATION: 12/19/2024    SURGEON:  Jo Walsh MD     ASSISTANT SURGEON:  none     OPERATION:     1. Bilateral endoscopic crow bullosa removal   2. Septoplasty  3. Bilateral inferior turbinate reduction with submucosal resection.     PREOPERATIVE DIAGNOSIS:      1. Nasal obstruction  2. Crow bullosa  3. Hypertrophic turbinates.  4. Nasal septal deviation     POSTOPERATIVE DIAGNOSIS:      1. Nasal obstruction  2. Rcow bullosa  3. Hypertrophic turbinates.  4. Nasal septal deviation     ANESTHESIA: General.     COMPLICATIONS: None.     ESTIMATED BLOOD LOSS: 25 mL     SPECIMEN: Bilateral sinonasal contents     WOUND EXPECTANCY: Clean-contaminated.    DRESSING: merogel packing  in bilateral middle meatus. Nasopore pack bilaterally.     FINDINGS: Bilateral crow bullosa with obstruction of middle meatus, NSD to the left with bony spur; bilateral inferior tubinate hypertrophy.      INDICATIONS: Nasal obstruction, NSD, inferior turbinate hypertrophy not improved with medical management.      I discussed the risks, benefits and alternatives of surgical correction of the chronically obstructed sinuses and associated turbinate hypertrophy with the patient as well as the expected postoperative course. I gave her the opportunity to ask questions and I answered all of them. On the morning of surgery I again met with the patient and reviewed the indications for surgery and she consented to proceed.     DESCRIPTION OF PROCEDURE: The patient was brought to the operating room and placed supine on the operating table. The patient was placed under general anesthesia and intubated. The patient was positioned with a donut under the head.  Cottonoid pledgets soaked with neosynepherine  were placed into the nasal cavity bilaterally for mucosal decongestion.  Prophylactic cefazolin was given prior to the surgery start. A time-out was performed to confirm the proper patient, site and procedure.   The patient was prepped and  draped in the usual fashion.  The bed was placed in 20-degree reverse Trendelenberg position.     A 0-degree endoscope was used to examine the nasal cavity.  Local injections of 1% lidocaine with 1:100,000 parts epinephrine were then performed around the middle turbinates bilaterally.   The right  middle turbinate was medialized to with a ari elevator to gain access into the middle meatus.  The crow bullosa obstructed access to the middle meatus.  A vertical incision was made at the anterior head of the turbinate using a sickle knife.  The incision was carried posteriorly and inferiorly using a Theron scissors.  The lateral half of the conchal cell was then removed, taking care not to destabilize the superior attachment of the middle turbinate. An identical procedure was performed for the left obstructing crow bullosa. .       At this point, approximately 8 cc of 1% lidocaine with epinephrine was injected into the septum bilaterally in the submucoperichondrial plane.  A 15 blade was then used to make a hemitransfixion incision on the left.  The mucoperichondrial flap was then elevated first on the left, and then the incision was carried through the septum to the right and the right flap was elevated in a similar fashion.  Using a swivel knife as well as double action forceps, the deviated portion of the septum was removed.    Next, the head of each inferior turbinate was injected with 0.5 cc of 1% lidocaine with epinephrine and a 15 blade was used to make a small incision at the head of the turbinate.  A Appling elevator was used to elevate the erectile tissue of the inferior turbinate.  The micodebrider with the turbinate blade was then used to remove the erectile tissue in the submucous plane.      The anterior septal incision was then closed using a chromic suture, and the bilateral mucoperichondrial flaps were re approximated using a quilting caopting suture technique with a 4-0 plain gut suture.    Silastic splints were then placed along the septum and secured anteriorly using a 2-0 silk suture.      At this point, the pledgets were all removed.   Merogel packing  was placed into the bilateral middle meatus to stent open the surgical site.  The nasal cavity was packed with nasopore bilaterally.  Mupirocin ointment was applied to the vestibule bilaterally.  At this point, the drapes were taken down The patient was turned back toward the anesthesiologist and awakened from anesthesia, extubated and transferred to the recovery room in stable condition.

## 2024-12-19 NOTE — DISCHARGE INSTRUCTIONS
INSTRUCTIONS TO FOLLOW AFTER NASAL SURGERY  DR. Tauzin - [facility]      Your first office visit with Dr. Walsh after surgery should have been already scheduled.  If you dont know when it is, call Dr. Walsh's office at [phone].    ACTIVITY:    Sleep on your back with the head of the bead elevated, up on 2-3 pillows, or in a recliner for the first 3 to 5 days. This will help with swelling.     After surgery you may have a lot of nasal drainage. This is normal. Do not wipe, touch, or dab your nose. You may breathe through your nose if youre able but avoid inhaling forcibly. Let all drainage fall on your mustache dressing and change it as needed.    You may shower 24 hours after surgery.    If you use CPAP or BiPAP to sleep at night, you should wait at least 48 hours before resuming use.  Dr. Walsh will advise you when it is safe to do this.    DRESSINGS:    Change the mustache dressing under your nose as needed. (If unsure what this dressing is or how to do this, ask your doctor or nurse before you leave the clinic/hospital.) You may have pinkish-red drainage for 2-3 days.    In certain cases you may have gauze packing inside your nostrils.  If so, these will turn from white to red from the drainage. This is normal so do not be alarmed. Do not touch or pull at the packing. The packing will be removed by your doctor at your first post-op visit.     You may also have a dissolvable stent or dissolvable sponge placed into the sinuses during surgery.  These usually do not need to be removed unless you are told otherwise by Dr. Walsh.  You may notice small fragments of these items come out of your nose in the weeks following surgery.    MEDICATIONS:  After surgery, you are generally sent home with prescription for an antibiotic, a pain medication, and an anti-nausea medication.     Start your antibiotics when you get home from surgery and take them until they are all gone.  It is best to take them with food to  prevent an upset stomach.    Most people need prescription pain medication for the first few days after surgery.  If you find that this is not necessary, you may use over-the-counter Tylenol (Acetaminophen) instead.    Avoid Aspirin, Motrin (Ibuprofen), Advil, Aleve and Vitamin E for 1 week before surgery and 1 week after surgery. There are many other medications to avoid as well due to the fact they act as blood thinners.      Some people have problems with bowel movements after surgery. If you have NOT had a bowel movement 3-5 days after surgery, go to your local pharmacy and purchase an over the counter stool softener such as COLACE. You can also ask the pharmacist for his or her recommendation. If you still do not have a bowel movement after starting the softener, please call Dr. Burnette office.    You will need these over-the-counter medications after surgery:    Saline Sinus Rinse ([employer] brand):  You will use this keep the packing moist after surgery.  Begin doing this two days after surgery, unless instructed otherwise by Dr. Walsh.  You should do this 2 times a day, following the instructions on the box. Apply saline to the nasal packs in each nasal cavity.     Afrin (regular strength): Only use if you have nasal congestion or bleeding. Use 2 times per day for 3 days, stop for 1 day, continue 2 times per day for 3 days, then stop completely.  NOTE:  You may not need to do this at all.      RESTRICTED ACTIVITIES AFTER SURGERY:    Do NOT blow your nose for 2 weeks. If you have to sneeze or cough, do so with your mouth open.   AVOID all heavy lifting, straining or bending for 2 weeks.   AVOID any sexual activity for 2 weeks after surgery.  AVOID semi-contact sports or vigorous exercising for 3-4 weeks. Dr. Walsh will let you know when you are cleared to resume exercise.  No Swimming for 3-4 weeks.  No Flying for 2 weeks.    Do NOT operate a motor vehicle or any type of heavy machinery within 24 hours of  taking pain medication.  DO NOT smoke or be around smokers.  AVOID irritating substances such as dust, chalk, harsh chemicals, and allergic triggers.    DIET:    Avoid hot and spicy foods, or salty soups for 1 week after surgery.  Begin with bland foods the day after surgery and advance to your regular diet as tolerated.  It is not necessary to take only soft food unless you are recovering from tonsil surgery.  Drink plenty of fluids (water is best).   Avoid alcoholic and caffeinated beverages for 1 week after surgery.    SEE PRESCRIPTION INSERTS FOR INFORMATION ON YOUR MEDICATIONS     ANESTHESIA  -For the first 24 hours after surgery:  Do not drive, use heavy equipment, make important decisions, or drink alcohol  -It is normal to feel sleepy for several hours.  Rest until you are more awake.  -Have someone stay with you, if needed.  They can watch for problems and help keep you safe.  -Some possible post anesthesia side effects include: nausea and vomiting, sore throat and hoarseness, sleepiness, and dizziness.    PAIN  -If you have pain after surgery, pain medicine will help you feel better.  Take it as directed, before pain becomes severe.  Most pain relievers taken by mouth need at least 20-30 minutes to start working.  -Do not drive or drink alcohol while taking pain medicine.  -Pain medication can upset your stomach.  Taking them with a little food may help.  -Other ways to help control pain: elevation, ice, and relaxation  -Call your surgeon if still having unmanageable pain an hour after taking pain medicine.  -Pain medicine can cause constipation.  Taking an over-the counter stool softener while on prescription pain medicine and drinking plenty of fluids can prevent this side effect.  -Call your surgeon if you have severe side effects like: breathing problems, trouble waking up, dizziness, confusion, or severe constipation.    NAUSEA  -Some people have nausea after surgery.  This is often because of  anesthesia, pain, pain medicine, or the stress of surgery.  -Do not push yourself to eat.  Start off with clear liquids and soup.  Slowly move to solid foods.  Don't eat fatty, rich, spicy foods at first.  Eat smaller amounts.  -If you develop persistent nausea and vomiting please notify your surgeon immediately.    BLEEDING  -Different types of surgery require different types of care and dressing changes.  It is important to follow all instructions and advice from your surgeon.  Change dressing as directed.  Call your surgeon for any concerns regarding postop bleeding.    SIGNS OF INFECTION  -Signs of infection include: fever, swelling, drainage, and redness  -Notify your surgeon if you have a fever of 100.4 F (38.0 C) or higher.  -Notify your surgeon if you notice redness, swelling, increased pain, pus, or a foul smell at the incision site.

## 2024-12-19 NOTE — TRANSFER OF CARE
"Anesthesia Transfer of Care Note    Patient: Lupe Machuca    Procedure(s) Performed: Procedure(s) (LRB):  SEPTOPLASTY, NOSE (N/A)  CAUTERIZATION, MUCOSA, NASAL TURBINATE (Bilateral)  ENDOSCOPY, NOSE, WITH REUBEN BULLOSA RESECTION (Bilateral)    Patient location: PACU    Anesthesia Type: general    Transport from OR: Transported from OR on 6-10 L/min O2 by face mask with adequate spontaneous ventilation    Post pain: adequate analgesia    Post assessment: no apparent anesthetic complications and tolerated procedure well    Post vital signs: stable    Level of consciousness: awake and alert    Nausea/Vomiting: no nausea/vomiting    Complications: none    Transfer of care protocol was followed      Last vitals: Visit Vitals  /73   Pulse 82   Temp 36.7 °C (98.1 °F) (Skin)   Resp 19   Ht 5' 2" (1.575 m)   Wt 59.9 kg (132 lb)   SpO2 99%   Breastfeeding No   BMI 24.14 kg/m²     "

## 2024-12-19 NOTE — INTERVAL H&P NOTE
The patient has been examined and the H&P has been reviewed:    I concur with the findings and no changes have occurred since H&P was written.    Surgery risks, benefits and alternative options discussed and understood by patient/family.    Current Outpatient Medications   Medication Instructions    azelastine (ASTELIN) 137 mcg, Nasal, 2 times daily    ciprofloxacin-dexAMETHasone 0.3-0.1% (CIPRODEX) 0.3-0.1 % DrpS INSTILL 4 DROPS INTO AFFECTED EAR(S) BY OTIC ROUTE 2 TIMES PER DAY FOR 7 DAYS    EScitalopram oxalate (LEXAPRO) 20 mg, Oral, Daily    fluticasone propionate (FLONASE) 100 mcg, Each Nostril, Daily    hydrOXYzine HCL (ATARAX) 25 MG tablet TAKE 1 TABLET(25 MG) BY MOUTH THREE TIMES DAILY AS NEEDED FOR ANXIETY OR SLEEP    levocetirizine (XYZAL) 5 mg, Oral, Nightly    methylPREDNISolone (MEDROL DOSEPACK) 4 mg tablet use as directed    montelukast (SINGULAIR) 10 mg, Oral, Nightly    omeprazole (PRILOSEC) 20 mg, Oral, Daily, Take on empty stomach 30 min before eating    traZODone (DESYREL) 50 mg, Oral, Nightly           There are no hospital problems to display for this patient.

## 2024-12-20 NOTE — ANESTHESIA POSTPROCEDURE EVALUATION
Anesthesia Post Evaluation    Patient: Lupe Machuca    Procedure(s) Performed: Procedure(s) (LRB):  SEPTOPLASTY, NOSE (N/A)  CAUTERIZATION, MUCOSA, NASAL TURBINATE (Bilateral)  ENDOSCOPY, NOSE, WITH REUBEN BULLOSA RESECTION (Bilateral)    Final Anesthesia Type: general      Patient location during evaluation: PACU  Patient participation: Yes- Able to Participate  Level of consciousness: responds to stimulation  Post-procedure vital signs: reviewed and stable  Pain management: adequate  Airway patency: patent  YOVANNY mitigation strategies: Multimodal analgesia  PONV status at discharge: No PONV  Anesthetic complications: no      Cardiovascular status: hemodynamically stable  Respiratory status: spontaneous ventilation and room air  Hydration status: euvolemic  Follow-up not needed.              Vitals Value Taken Time   /59 12/19/24 1145   Temp 36.7 °C (98.1 °F) 12/19/24 1145   Pulse 80 12/19/24 1145   Resp 17 12/19/24 1145   SpO2 96 % 12/19/24 1145         Event Time   Out of Recovery 09:59:28         Pain/Eric Score: Pain Rating Prior to Med Admin: 10 (12/19/2024  9:23 AM)  Eric Score: 10 (12/19/2024 12:00 PM)

## 2024-12-26 ENCOUNTER — OFFICE VISIT (OUTPATIENT)
Dept: OTOLARYNGOLOGY | Facility: CLINIC | Age: 19
End: 2024-12-26
Payer: COMMERCIAL

## 2024-12-26 VITALS
SYSTOLIC BLOOD PRESSURE: 121 MMHG | HEART RATE: 93 BPM | WEIGHT: 125.69 LBS | DIASTOLIC BLOOD PRESSURE: 81 MMHG | HEIGHT: 62 IN | BODY MASS INDEX: 23.13 KG/M2

## 2024-12-26 DIAGNOSIS — J34.2 NASAL SEPTAL DEVIATION: Primary | Chronic | ICD-10-CM

## 2024-12-26 DIAGNOSIS — J34.89 CONCHA BULLOSA: Chronic | ICD-10-CM

## 2024-12-26 DIAGNOSIS — J34.3 HYPERTROPHY OF INFERIOR NASAL TURBINATE: Chronic | ICD-10-CM

## 2024-12-26 DIAGNOSIS — J34.89 NASAL OBSTRUCTION: Chronic | ICD-10-CM

## 2024-12-26 PROCEDURE — 99999 PR PBB SHADOW E&M-EST. PATIENT-LVL III: CPT | Mod: PBBFAC,,, | Performed by: OTOLARYNGOLOGY

## 2024-12-26 RX ORDER — MUPIROCIN 20 MG/G
OINTMENT TOPICAL 2 TIMES DAILY
Qty: 15 G | Refills: 3 | Status: SHIPPED | OUTPATIENT
Start: 2024-12-26 | End: 2025-01-05

## 2024-12-26 NOTE — PATIENT INSTRUCTIONS
Continue nasal saline spray/irrigations.   Mupirocin ointment to nostrils twice daily.     Follow up with Dr. Walsh in 3-4 weeks.

## 2024-12-26 NOTE — PROGRESS NOTES
"  Subjective:      Lupe Machuca is a 19 y.o. female who comes for follow-up 1 week status-post septum and turbinate surgery, as well as crow bullosa excision by Dr. Walsh.  She does not report any post operative issues.  No bleeding, pain well controlled. She has been doing her saline irrigations BID.           Objective:     /81   Pulse 93   Ht 5' 2" (1.575 m)   Wt 57 kg (125 lb 10.6 oz)   BMI 22.98 kg/m²      General:   not in distress   Nasal:  edematous mucosa   incision intact   no septal hematoma   no bleeding;  Splints in place- suture removed and splints removed bilaterally   Oral Cavity:   clear   Oropharynx:   no bleeding   Neck:   nontender     Nasal/sinus endoscopy    Date/Time: 12/26/2024 3:00 PM    Performed by: Marleny Gaffney MD  Authorized by: Marleny Gaffney MD    Consent Done?:  Yes (Verbal)  Anesthesia:     Local anesthetic:  Topical anesthetic    Location: bilateral.    Patient tolerance:  Patient tolerated the procedure well with no immediate complications (Patient had mild nausea and slight vasovagal symptoms with debridement and endoscopy, but these were self-limited and resolved in a couple of minutes)  Nose:     Procedure Performed:  Nasal Endoscopy and Removal of Debridement  External:      No external nasal deformity  Intranasal:      Mucosa no polyps     Mucosa ulcers not present     No mucosa lesions present     Turbinates not enlarged     No septum gross deformity  Nasopharynx:      No mucosa lesions     Adenoids not present     Posterior choanae patent     Eustachian tube patent     NasoPore packing debrided from nasal cavities bilaterally.  MeroGel packing in middle meatus bilaterally left in place for additional medialization of middle turbinate.  Septum intact, minimal oozing or bleeding noted.  Turbinates healing well.            Assessment:     Doing well following septoplasty and turbinate reduction and bilateral crow bullosa excision.    1. Nasal septal deviation "    2. Hypertrophy of inferior nasal turbinate    3. Piedad bullosa    4. Nasal obstruction         Plan:     Continue b.i.d. saline irrigations.    Mupirocin ointment to septal incision and bilateral nostrils b.i.d..      Follow up in about 4 weeks (around 1/23/2025).      Marleny Gaffney MD  [facility]    This note was created by combination of typed  and MModal dictation.  Transcription errors may be present.  If there are any questions, please contact me.

## 2025-01-03 ENCOUNTER — OFFICE VISIT (OUTPATIENT)
Dept: OTOLARYNGOLOGY | Facility: CLINIC | Age: 20
End: 2025-01-03
Payer: COMMERCIAL

## 2025-01-03 VITALS
HEART RATE: 94 BPM | SYSTOLIC BLOOD PRESSURE: 112 MMHG | BODY MASS INDEX: 23.63 KG/M2 | DIASTOLIC BLOOD PRESSURE: 65 MMHG | WEIGHT: 129.19 LBS

## 2025-01-03 DIAGNOSIS — J34.89 CONCHA BULLOSA: ICD-10-CM

## 2025-01-03 DIAGNOSIS — J34.89 NASAL OBSTRUCTION: ICD-10-CM

## 2025-01-03 DIAGNOSIS — J34.2 NASAL SEPTAL DEVIATION: ICD-10-CM

## 2025-01-03 DIAGNOSIS — J34.3 HYPERTROPHY OF INFERIOR NASAL TURBINATE: ICD-10-CM

## 2025-01-03 DIAGNOSIS — J30.9 CHRONIC ALLERGIC RHINITIS: Primary | ICD-10-CM

## 2025-01-03 PROCEDURE — 99999 PR PBB SHADOW E&M-EST. PATIENT-LVL III: CPT | Mod: PBBFAC,,, | Performed by: OTOLARYNGOLOGY

## 2025-01-03 NOTE — PROCEDURES
Procedures    Endoscopic Sinonasal Debridement    Indication:  Wound debridement following endoscopic sinus surgery/excision crow bullosa    Fiberoptic nasal endoscopy was used to assist with debridement of the sinonasal cavities as part of necessary postoperative care.  Informed consent was obtained prior to proceeding.  The nasal cavity was decongested with topical 0.25% phenylephrine and anesthetized with 4% lidocaine.  A rigid 0-degree endoscope was introduced into the nasal cavity.    Findings:  Nasal cavity:  inferior turbinates and septum intact   no synechiae   Middle meatus:   dense crusted debris present bilaterally   debrided with Polo forceps   widely patent following debridement  Bilateral middle turbinates healing well     The patient tolerated the procedure well.

## 2025-01-03 NOTE — PROGRESS NOTES
Subjective:      Lupe Machuca is a 19 y.o. female who comes for follow-up  approximately 2 weeks  status-post septoplasty, SMRT,  and endoscopic piedad bullosa excision. She has been using nasal saline rinses as directed. No complaints today.           Objective:     /65   Pulse 94   Wt 58.6 kg (129 lb 3 oz)   BMI 23.63 kg/m²      General:   not in distress   Nasal: Nasal septum midline  Left hemitransfixion incision healing well  edematous mucosa   incision intact   no septal hematoma   no bleeding  Bilateral inferior turbinates healing well   Oral Cavity:   clear   Oropharynx:   no bleeding   Neck:   nontender       Procedure     Nasal endoscopy with debridement         Data Reviewed    None.      Assessment:         1. Chronic allergic rhinitis    2. Hypertrophy of inferior nasal turbinate    3. Piedad bullosa    4. Nasal obstruction    5. Nasal septal deviation         Plan:   Continue b.i.d. saline irrigations.    Mupirocin ointment to septal incision and bilateral nostrils b.i.d.       Follow up in about 4 weeks (around 1/31/2025).       Jo Fairchild MD

## 2025-01-11 DIAGNOSIS — F41.9 ANXIETY AND DEPRESSION: ICD-10-CM

## 2025-01-11 DIAGNOSIS — F32.A ANXIETY AND DEPRESSION: ICD-10-CM

## 2025-01-12 ENCOUNTER — PATIENT MESSAGE (OUTPATIENT)
Dept: PSYCHIATRY | Facility: CLINIC | Age: 20
End: 2025-01-12
Payer: COMMERCIAL

## 2025-01-13 RX ORDER — HYDROXYZINE HYDROCHLORIDE 25 MG/1
TABLET, FILM COATED ORAL
Qty: 30 TABLET | Refills: 2 | Status: SHIPPED | OUTPATIENT
Start: 2025-01-13

## 2025-01-13 RX ORDER — LEVOCETIRIZINE DIHYDROCHLORIDE 5 MG/1
5 TABLET, FILM COATED ORAL NIGHTLY
Qty: 30 TABLET | Refills: 11 | Status: SHIPPED | OUTPATIENT
Start: 2025-01-13 | End: 2026-01-13

## 2025-01-13 RX ORDER — ESCITALOPRAM OXALATE 20 MG/1
20 TABLET ORAL DAILY
Qty: 90 TABLET | Refills: 0 | Status: SHIPPED | OUTPATIENT
Start: 2025-01-13 | End: 2025-01-16 | Stop reason: SDUPTHER

## 2025-01-16 ENCOUNTER — OFFICE VISIT (OUTPATIENT)
Dept: PSYCHIATRY | Facility: CLINIC | Age: 20
End: 2025-01-16
Payer: COMMERCIAL

## 2025-01-16 VITALS
HEART RATE: 97 BPM | WEIGHT: 128.75 LBS | HEIGHT: 62 IN | SYSTOLIC BLOOD PRESSURE: 117 MMHG | DIASTOLIC BLOOD PRESSURE: 68 MMHG | BODY MASS INDEX: 23.69 KG/M2

## 2025-01-16 DIAGNOSIS — F32.1 CURRENT MODERATE EPISODE OF MAJOR DEPRESSIVE DISORDER WITHOUT PRIOR EPISODE: Primary | ICD-10-CM

## 2025-01-16 DIAGNOSIS — F41.1 GAD (GENERALIZED ANXIETY DISORDER): ICD-10-CM

## 2025-01-16 PROCEDURE — 99999 PR PBB SHADOW E&M-EST. PATIENT-LVL II: CPT | Mod: PBBFAC,,, | Performed by: PSYCHIATRY & NEUROLOGY

## 2025-01-16 PROCEDURE — G2211 COMPLEX E/M VISIT ADD ON: HCPCS | Mod: S$GLB,,, | Performed by: PSYCHIATRY & NEUROLOGY

## 2025-01-16 PROCEDURE — 3078F DIAST BP <80 MM HG: CPT | Mod: CPTII,S$GLB,, | Performed by: PSYCHIATRY & NEUROLOGY

## 2025-01-16 PROCEDURE — 90833 PSYTX W PT W E/M 30 MIN: CPT | Mod: S$GLB,,, | Performed by: PSYCHIATRY & NEUROLOGY

## 2025-01-16 PROCEDURE — 99214 OFFICE O/P EST MOD 30 MIN: CPT | Mod: S$GLB,,, | Performed by: PSYCHIATRY & NEUROLOGY

## 2025-01-16 PROCEDURE — 3008F BODY MASS INDEX DOCD: CPT | Mod: CPTII,S$GLB,, | Performed by: PSYCHIATRY & NEUROLOGY

## 2025-01-16 PROCEDURE — 3074F SYST BP LT 130 MM HG: CPT | Mod: CPTII,S$GLB,, | Performed by: PSYCHIATRY & NEUROLOGY

## 2025-01-16 RX ORDER — LAMOTRIGINE 25 MG/1
25 TABLET ORAL DAILY
Qty: 30 TABLET | Refills: 2 | Status: SHIPPED | OUTPATIENT
Start: 2025-01-16 | End: 2025-01-27 | Stop reason: DRUGHIGH

## 2025-01-16 RX ORDER — TRAZODONE HYDROCHLORIDE 100 MG/1
100 TABLET ORAL NIGHTLY
Qty: 90 TABLET | Refills: 1 | Status: SHIPPED | OUTPATIENT
Start: 2025-01-16 | End: 2026-01-16

## 2025-01-16 RX ORDER — ESCITALOPRAM OXALATE 20 MG/1
20 TABLET ORAL DAILY
Qty: 90 TABLET | Refills: 0 | Status: SHIPPED | OUTPATIENT
Start: 2025-01-16 | End: 2025-01-27 | Stop reason: SDUPTHER

## 2025-01-16 NOTE — PROGRESS NOTES
[facility]      Return Psychiatry Note    1/16/2025    History of Present Illness    CHIEF COMPLAINT:  Lupe presents for follow-up of anxiety and depression, last seen on June 5th, 2024, approximately six months ago.    HPI:  Lupe reports ongoing issues with anxiety and depression. At the last visit, her RUPINDER-7 score was 20 (indicating severe anxiety) and PHQ-9 score was 18 (indicating moderately severe depression). She experiences severe mood swings, including frequent breakdowns over a week-long period, involving throwing things, screaming, crying, and staying up for three days without medication. These episodes have become more frequent and intense, occurring twice in one month.    Lupe mentions difficulty functioning, including inability to do schoolwork, wanting to stay in bed, and lashing out at coworkers and managers. This has led to trouble at work, resulting in a two-day mental health break. She reports engaging in impulsive spending, particularly when bored, spending her entire paycheck ($300) on [place] decorations.    Lupe expresses difficulty managing her emotions when at home, reverting to childlike behavior and experiencing conflicts with family members, particularly her sisters. She reports that Trazodone alone is not effective for maintaining sleep throughout the night. She combines it with Hydroxyzine (25mg) prescribed by another doctor to achieve better sleep. Lupe mentions feeling sleepy for an hour or two after taking Lexapro in the morning.    MEDICATIONS:  Lupe is on Lexapro 20 mg daily for anxiety and depression, with the dosage recently increased. She takes Trazodone 50 mg at night for difficulty sleeping, which is ineffective when taken alone. Hydroxyzine 25 mg is taken with Trazodone to improve its effectiveness for sleep.    LIFESTYLE:  Lupe is currently in school and has a job. She lives in a dorm at school in [place] with roommates, returning to [place] for breaks.  "Her relationship with family, particularly her sisters, is strained. Lupe has a supportive roommate named Rafita, while another roommate causes her annoyance. She feels more mature and grown when in [place] for school, describing herself as a "different person". Lupe experiences stress and mood fluctuations that impact her social interactions.    FAMILY HISTORY:  Family history is significant for mother suggesting she might need a mood stabilizer, indicating a possible family history of mood disorders.    LABS:  Lupe's RUPINDER-7 score on June 5, 2024, was 20. On the same date, her PHQ-9 score was 18, indicating moderately severe depression.      ROS:  General: -fever, -chills, -fatigue, -weight gain, -weight loss  Eyes: -vision changes, -redness, -discharge  ENT: -ear pain, -nasal congestion, -sore throat  Cardiovascular: -chest pain, -palpitations, -lower extremity edema  Respiratory: -cough, -shortness of breath  Gastrointestinal: -abdominal pain, -nausea, -vomiting, -diarrhea, -constipation, -blood in stool  Genitourinary: -dysuria, -hematuria, -frequency  Musculoskeletal: -joint pain, -muscle pain  Skin: -rash, -lesion  Neurological: -headache, -dizziness, -numbness, -tingling  Psychiatric: +anxiety, +depression, +sleep difficulty, +mood swings, +emotional lability          A review of 10+ systems was conducted with pertinent positive and negative findings documented in HPI with all other systems reviewed and negative.    Past medical, family, surgical and social history reviewed as documented in chart with pertinent positive medical, family, surgical and social history detailed in HPI.    Exam Findings:    Physical Exam    General: No acute distress. Well-developed. Well-nourished.  Eyes: EOMI. Sclerae anicteric.  HENT: Normocephalic. Atraumatic. Nares patent. Moist oral mucosa.  Ears: Bilateral TMs clear. Bilateral EACs clear.  Cardiovascular: Regular rate. Regular rhythm. No murmurs. No rubs. No gallops. Normal S1, " S2.  Respiratory: Normal respiratory effort. Clear to auscultation bilaterally. No rales. No rhonchi. No wheezing.  Abdomen: Soft. Non-tender. Non-distended. Normoactive bowel sounds.  Musculoskeletal: No  obvious deformity.  Extremities: No lower extremity edema.  Neurological: Oriented to person. Oriented to place. Oriented to time. No slurred speech. Normal gait.  Psychiatric: Normal mood. Normal affect. Good insight. Good judgment.  Skin: Warm. Dry. No rash.        MSE  Appearance: casual dress  Behavior: calm  Speech: normal rate and volume  Mood/ affect: euthymic  TC: no SI/ no HI  TP: linear  Insight: fair  Judgement: fair    Assessment/Plan:    F33.1 Major depressive disorder, recurrent, moderate  F41.1 Generalized anxiety disorder    MAJOR DEPRESSIVE DISORDER AND GENERALIZED ANXIETY DISORDER:  - Assessed anxiety and depression symptoms, noting severe anxiety (RUPINDER-7 score 20) and moderately severe depression (PHQ-9 score 18) at last visit.  - Evaluated current medications, including Lexapro and trazodone, for efficacy and potential adjustments.  - Continued Lexapro 20mg daily, with option to take at night if morning dose causes drowsiness.  - Increased trazodone from 50mg to 100mg at bedtime for improved sleep.    MOOD DYSREGULATION:  -  Considered mood stabilization due to reported mood fluctuations and outbursts.  - Recommend Lamictal (lamotrigine) as a mood stabilizer, starting at low dose with gradual titration.  - Informed about the risk of Jim Christiano syndrome with Lamictal and importance of monitoring for rash.  - Reviewed common side effects of Lamictal, including headache, nausea, and GI issues.  - Started Lamictal 25mg daily for 1 week, with instructions to message provider about effects before potential increase to 50mg.  - Contact office immediately if rash develops while taking Lamictal and discontinue medication.    FOLLOW-UP AND MONITORING:  - Contact office within a week to report effects  of Lamictal.  - Follow up in last week of January before returning to school if medication adjustments are needed.  - Message about any side effects experienced with new medication regimen.

## 2025-01-19 ENCOUNTER — PATIENT MESSAGE (OUTPATIENT)
Dept: OTOLARYNGOLOGY | Facility: CLINIC | Age: 20
End: 2025-01-19
Payer: COMMERCIAL

## 2025-01-24 ENCOUNTER — PATIENT MESSAGE (OUTPATIENT)
Dept: PSYCHIATRY | Facility: CLINIC | Age: 20
End: 2025-01-24
Payer: COMMERCIAL

## 2025-01-24 ENCOUNTER — OFFICE VISIT (OUTPATIENT)
Dept: OTOLARYNGOLOGY | Facility: CLINIC | Age: 20
End: 2025-01-24
Payer: COMMERCIAL

## 2025-01-24 VITALS
WEIGHT: 129.94 LBS | BODY MASS INDEX: 23.77 KG/M2 | SYSTOLIC BLOOD PRESSURE: 133 MMHG | DIASTOLIC BLOOD PRESSURE: 75 MMHG | HEART RATE: 97 BPM

## 2025-01-24 DIAGNOSIS — Z98.890 S/P FUNCTIONAL ENDOSCOPIC SINUS SURGERY: ICD-10-CM

## 2025-01-24 DIAGNOSIS — J30.9 CHRONIC ALLERGIC RHINITIS: Primary | Chronic | ICD-10-CM

## 2025-01-24 PROCEDURE — 3078F DIAST BP <80 MM HG: CPT | Mod: CPTII,S$GLB,, | Performed by: OTOLARYNGOLOGY

## 2025-01-24 PROCEDURE — 3008F BODY MASS INDEX DOCD: CPT | Mod: CPTII,S$GLB,, | Performed by: OTOLARYNGOLOGY

## 2025-01-24 PROCEDURE — 99999 PR PBB SHADOW E&M-EST. PATIENT-LVL II: CPT | Mod: PBBFAC,,, | Performed by: OTOLARYNGOLOGY

## 2025-01-24 PROCEDURE — 99213 OFFICE O/P EST LOW 20 MIN: CPT | Mod: 25,S$GLB,, | Performed by: OTOLARYNGOLOGY

## 2025-01-24 PROCEDURE — 3075F SYST BP GE 130 - 139MM HG: CPT | Mod: CPTII,S$GLB,, | Performed by: OTOLARYNGOLOGY

## 2025-01-24 RX ORDER — FLUTICASONE PROPIONATE 50 MCG
2 SPRAY, SUSPENSION (ML) NASAL DAILY
Qty: 9.9 ML | Refills: 11 | Status: SHIPPED | OUTPATIENT
Start: 2025-01-24

## 2025-01-24 NOTE — PROGRESS NOTES
Subjective:      Lupe Machuca is a 19 y.o. female who comes for follow-up  approximately 1 weeks  status-post septoplasty, SMRT,  and endoscopic crow bullosa excision. She has been using nasal saline rinses as directed. No complaints today.     Objective:     /75   Pulse 97   Wt 59 kg (129 lb 15.4 oz)   BMI 23.77 kg/m²      General:   not in distress   Nasal: Nasal septum midline  Left hemitransfixion incision well healed  Mildly edematous mucosa   no bleeding  Bilateral inferior turbinates healing well   Oral Cavity:   clear   Oropharynx:   no bleeding   Neck:   nontender       Procedure     Nasal endoscopy with debridement         Data Reviewed    None.      Assessment:         1. Chronic allergic rhinitis    2. S/P functional endoscopic sinus surgery         Plan:   Continue b.i.d. saline irrigations.    Flonase 2 sprays per nostril daily  Resume xyzal 5 mg PO daily and montelukast 10 mg PO nightly      No follow-ups on file.       Jo Fairchild MD

## 2025-01-24 NOTE — PROCEDURES
Procedures    Endoscopic Sinonasal Debridement    Indication:  Wound debridement following endoscopic sinus surgery/excision crow bullosa    Fiberoptic nasal endoscopy was used to assist with debridement of the sinonasal cavities as part of necessary postoperative care.  Informed consent was obtained prior to proceeding.  The nasal cavity was decongested with topical 0.25% phenylephrine and anesthetized with 4% lidocaine.  A rigid 0-degree endoscope was introduced into the nasal cavity.    Findings:  Nasal cavity:  inferior turbinates and septum intact   no synechiae   Middle meatus:   minimal debris bilaterally   debrided with Polo forceps   widely patent following debridement  Bilateral middle turbinates healing well     The patient tolerated the procedure well.

## 2025-01-27 ENCOUNTER — OFFICE VISIT (OUTPATIENT)
Dept: PSYCHIATRY | Facility: CLINIC | Age: 20
End: 2025-01-27
Payer: COMMERCIAL

## 2025-01-27 DIAGNOSIS — F41.1 GAD (GENERALIZED ANXIETY DISORDER): ICD-10-CM

## 2025-01-27 DIAGNOSIS — F32.1 CURRENT MODERATE EPISODE OF MAJOR DEPRESSIVE DISORDER WITHOUT PRIOR EPISODE: Primary | ICD-10-CM

## 2025-01-27 RX ORDER — LAMOTRIGINE 25 MG/1
50 TABLET ORAL DAILY
Qty: 180 TABLET | Refills: 1 | Status: SHIPPED | OUTPATIENT
Start: 2025-01-27 | End: 2026-01-27

## 2025-01-27 RX ORDER — ESCITALOPRAM OXALATE 20 MG/1
20 TABLET ORAL DAILY
Qty: 90 TABLET | Refills: 0 | Status: SHIPPED | OUTPATIENT
Start: 2025-01-27 | End: 2026-01-27

## 2025-01-27 NOTE — PROGRESS NOTES
"  [facility]      Return Psychiatry Note    1/27/2025    The patient location is: home  The chief complaint leading to consultation is: follow-up    Visit type: audiovisual    Face to Face time with patient: 10 minutes  31 minutes of total time spent on the encounter, which includes face to face time and non-face to face time preparing to see the patient (eg, review of tests), Obtaining and/or reviewing separately obtained history, Documenting clinical information in the electronic or other health record, Independently interpreting results (not separately reported) and communicating results to the patient/family/caregiver, or Care coordination (not separately reported).         Each patient to whom he or she provides medical services by telemedicine is:  (1) informed of the relationship between the physician and patient and the respective role of any other health care provider with respect to management of the patient; and (2) notified that he or she may decline to receive medical services by telemedicine and may withdraw from such care at any time.    History of Present Illness    CHIEF COMPLAINT:  Lupe, a 19-year-old female with major depression and generalized anxiety, presents for a follow-up visit to discuss her ongoing mood swings and conflicts with family members. She was last seen on January 16th, 2025.    HPI:  Lupe reports that her mood is "okay," but she continues to experience sudden outbursts of anger, which she describes as "blowing up randomly." These episodes, occurring several times per month with increasing frequency and intensity, are particularly frequent with her sister and involve screaming, crying, and throwing things.    Lupe describes ongoing conflicts with family members, especially her sister, often starting with perceived criticism or comments made behind her back, leading to her "snapping" at them. Her mother's attempts to calm her down are often met with resistance. " "Lupe's sister attributes these conflicts to the patient's mood instability.    Lupe is currently taking Lexapro 20mg daily, Trazodone 100mg at bedtime, and has recently started Lamictal at 25mg daily. While her mood is "okay," the persistence of symptoms suggests that the current medication regimen may not be fully effective in managing her condition.    Lupe is preparing to return to school, which may affect her symptom management and family dynamics. She expresses concern about managing her medications while away at school, requesting a 90-day supply to avoid frequent pharmacy visits.    MEDICATIONS:  Lupe is on Lexapro 20 mg daily for major depression and generalized anxiety and trazodone 100 mg qhs.  Recently, she started Lamictal 25 mg daily.    LIFESTYLE:  Lupe is currently attending school and will be returning on Friday later in the week. She will be living exclusively at school during the semester and will return home for the summer. Lupe experiences conflicts with family members, particularly with her sister, involving arguments where she "blows up" or "snaps" at her sister. Her mother tries to intervene in these situations.      ROS:  General: -fever, -chills, -fatigue, -weight gain, -weight loss  Eyes: -vision changes, -redness, -discharge  ENT: -ear pain, -nasal congestion, -sore throat  Cardiovascular: -chest pain, -palpitations, -lower extremity edema  Respiratory: -cough, -shortness of breath  Gastrointestinal: -abdominal pain, -nausea, -vomiting, -diarrhea, -constipation, -blood in stool  Genitourinary: -dysuria, -hematuria, -frequency  Musculoskeletal: -joint pain, -muscle pain  Skin: -rash, -lesion  Neurological: -headache, -dizziness, -numbness, -tingling  Psychiatric: -anxiety, -depression, -sleep difficulty, +mood swings, +emotional lability    A review of 10+ systems was conducted with pertinent positive and negative findings documented in HPI with all other systems reviewed and negative.    Past " medical, family, surgical and social history reviewed as documented in chart with pertinent positive medical, family, surgical and social history detailed in HPI.    Exam Findings:    MSE  Appearance: casual dress  Behavior: calm  Speech: normal rate and volume  Mood/ affect: euthymic  TC: no SI/ no HI  TP: linear  Insight: fair  Judgement: fair    Assessment/Plan:    Assessment & Plan    F32.A Depression, unspecified  F41.9 Anxiety disorder, unspecified    DEPRESSION AND ANXIETY:  - Assessed patient's response to recently initiated Lamictal 25mg daily for mood stabilization.  - Determined current dose insufficient for symptom control based on continued mood swings and interpersonal conflicts.  - Increased Lamictal from 25mg daily to 50mg daily to potentially improve mood stability.  - Provided 90-day supply.  - Continued Lexapro 20mg daily.  - Continued Trazodone 100mg qhs.  - Added refills to prescriptions.  - Lupe to monitor mood and behavior in different relationships and situations while at school.  - Recommend seeking therapy if interpersonal difficulties persist in various settings.    RELATIONSHIP PROBLEMS:  - Considered family conflicts as potentially situational due to extended time at home.  - Discussed that family conflicts are common, especially when returning home from school.    FOLLOW-UP AND CARE MANAGEMENT:  - Evaluated need for ongoing psychiatric care while patient is away at school; patient to follow with a psychiatrist at school if needed.  - Follow up over the summer when patient returns home.  - Contact the office via portal if needed before next visit.

## 2025-02-11 RX ORDER — MONTELUKAST SODIUM 10 MG/1
10 TABLET ORAL NIGHTLY
Qty: 30 TABLET | Refills: 6 | OUTPATIENT
Start: 2025-02-11

## 2025-02-11 RX ORDER — OMEPRAZOLE 20 MG/1
20 CAPSULE, DELAYED RELEASE ORAL DAILY
Qty: 30 CAPSULE | Refills: 3 | Status: SHIPPED | OUTPATIENT
Start: 2025-02-11 | End: 2026-02-11

## 2025-02-11 RX ORDER — OMEPRAZOLE 20 MG/1
20 CAPSULE, DELAYED RELEASE ORAL DAILY
Qty: 30 CAPSULE | Refills: 3 | OUTPATIENT
Start: 2025-02-11 | End: 2026-02-11

## 2025-02-11 RX ORDER — MONTELUKAST SODIUM 10 MG/1
10 TABLET ORAL NIGHTLY
Qty: 30 TABLET | Refills: 6 | Status: SHIPPED | OUTPATIENT
Start: 2025-02-11

## 2025-03-28 ENCOUNTER — OFFICE VISIT (OUTPATIENT)
Dept: PSYCHIATRY | Facility: CLINIC | Age: 20
End: 2025-03-28
Payer: COMMERCIAL

## 2025-03-28 DIAGNOSIS — F32.1 CURRENT MODERATE EPISODE OF MAJOR DEPRESSIVE DISORDER WITHOUT PRIOR EPISODE: ICD-10-CM

## 2025-03-28 DIAGNOSIS — F41.1 GAD (GENERALIZED ANXIETY DISORDER): Primary | ICD-10-CM

## 2025-03-28 DIAGNOSIS — F31.9 BIPOLAR 1 DISORDER: ICD-10-CM

## 2025-03-28 RX ORDER — LAMOTRIGINE 100 MG/1
100 TABLET ORAL DAILY
Qty: 30 TABLET | Refills: 2 | Status: SHIPPED | OUTPATIENT
Start: 2025-03-28 | End: 2026-03-28

## 2025-03-28 NOTE — PROGRESS NOTES
The patient location is: home  The chief complaint leading to consultation is: follow-up    Visit type: audiovisual    Face to Face time with patient: 13 minutes  31 minutes of total time spent on the encounter, which includes face to face time and non-face to face time preparing to see the patient (eg, review of tests), Obtaining and/or reviewing separately obtained history, Documenting clinical information in the electronic or other health record, Independently interpreting results (not separately reported) and communicating results to the patient/family/caregiver, or Care coordination (not separately reported).     Each patient to whom he or she provides medical services by telemedicine is:  (1) informed of the relationship between the physician and patient and the respective role of any other health care provider with respect to management of the patient; and (2) notified that he or she may decline to receive medical services by telemedicine and may withdraw from such care at any time.      [facility]      Return Psychiatry Note    3/28/2025    History of Present Illness    CHIEF COMPLAINT:  Lupe, a 19-year-old female with moderate major depression and generalized anxiety disorder, presents for follow-up after experiencing a manic episode lasting three weeks. Last seen on January 27th, 2025.    HPI:  Lupe reports experiencing a manic episode lasting approximately three weeks, which recently ended. The episode was characterized by inability to attend work or school, excessive shopping and spending, insomnia (with four consecutive days without sleep), emotional instability (including crying and vomiting at work), and impaired decision-making leading to risky behavior.    The manic episode significantly impacted the patient's daily life. She was unable to attend work or school for three weeks. When she attempted to go to work, she was sent home due to noticeable behavioral changes. She spent  "all her money during this period.    Prior to the manic episode, patient reports suicidal thoughts to her  and was referred to counseling services. The counselor recommended psychiatric evaluation for medication adjustment.    Lupe reports recent improvement, sleeping well for the first time the night before the appointment. She describes feeling "better" and that the manic episode has ended, noting that this episode was unusually long, typically lasting only a week.    Lupe's professors have been informed of the situation and are helping her catch up. Her workplace has offered mental health leave for future occurrences. Her mother flew her home for a break and to see her psychiatrist.    Currently, the patient is at home on break until Monday, describing it as "like a vacation" and an opportunity to relax. She denies going to the hospital or emergency department during the manic episode and experiencing any side effects from her current medications.    MEDICATIONS:  Lupe is on Lexapro 20 mg daily for moderate episode of major depression and generalized anxiety disorder. She is also on Lamictal 50 mg daily for mood instability, which has been increased from her previous dose. She is on Trazodone 100 mg daily.    LIFESTYLE:  Lupe is currently in school but recently experienced a manic episode that caused her to miss about three weeks of classes. She emailed her professors about the situation, who were understanding and are helping her catch up. Her spring semester is scheduled to end between May 20th and 26th. Lupe has a job but missed work during her recent manic episode. When she attempted to go to work during the episode, her coworkers noticed something was off and sent her home. Her workplace has informed her that if she needs sick hours in the future, she can inform them of her mental health needs, and they will assist her in taking mental leave. She also mentions having an LOS job with the " "potential for a new client. Regarding her living situation, the patient resides with a  (RA) at school. She is currently home for break, which she describes as "like a vacation".      ROS:  General: -fever, -chills, -fatigue, -weight gain, -weight loss  Eyes: -vision changes, -redness, -discharge  ENT: -ear pain, -nasal congestion, -sore throat  Cardiovascular: -chest pain, -palpitations, -lower extremity edema  Respiratory: -cough, -shortness of breath  Gastrointestinal: -abdominal pain, -nausea, +vomiting, -diarrhea, -constipation, -blood in stool  Genitourinary: -dysuria, -hematuria, -frequency  Musculoskeletal: -joint pain, -muscle pain  Skin: -rash, -lesion  Neurological: -headache, -dizziness, -numbness, -tingling  Psychiatric: -anxiety, -depression, +sleep difficulty, +sarah, +excessive crying, +suicidal ideation          A review of 10+ systems was conducted with pertinent positive and negative findings documented in HPI with all other systems reviewed and negative.    Past medical, family, surgical and social history reviewed as documented in chart with pertinent positive medical, family, surgical and social history detailed in HPI.    Exam Findings:    MSE  Appearance: casual dress  Behavior: cooperative  Speech: normal rate and volume  Mood/ affect: euthymic   TC: no SI/ no HI  TP: linear  Insight: fair  Judgement: fair         Assessment/Plan:    Assessment & Plan    F31.9 Bipolar 1 disorder  F41.1 Generalized anxiety disorder    BIPOLAR DISORDER (MANIC EPISODE):  - Assessed recent manic episode, noting severity and duration.  - Considered need for hospitalization during acute manic phases.  - Determined and explained rationale for Lamictal dose increase necessary for mood stabilization.  - Evaluated current medication regimen (Lexapro, Lamictal, Trazodone) for effectiveness.  - Discussed need for support system to assist in recognizing when hospitalization may be necessary.  - " Increased Lamictal to 100 mg daily from 50 mg daily for mood stabilization.  - Continued Lexapro 20 mg daily, Trazodone 100 mg daily.    GENERALIZED ANXIETY DISORDER:  - Continued Lexapro 20 mg daily.    FOLLOW-UP:  - Follow up during summer if returning home.  - Contact the office via patient portal for any difficulties or concerns.

## 2025-04-18 DIAGNOSIS — F32.1 CURRENT MODERATE EPISODE OF MAJOR DEPRESSIVE DISORDER WITHOUT PRIOR EPISODE: ICD-10-CM

## 2025-04-20 RX ORDER — ESCITALOPRAM OXALATE 20 MG/1
20 TABLET ORAL DAILY
Qty: 90 TABLET | Refills: 0 | Status: SHIPPED | OUTPATIENT
Start: 2025-04-20 | End: 2026-04-20

## 2025-04-22 RX ORDER — LEVOCETIRIZINE DIHYDROCHLORIDE 5 MG/1
5 TABLET, FILM COATED ORAL NIGHTLY
Qty: 30 TABLET | Refills: 11 | Status: SHIPPED | OUTPATIENT
Start: 2025-04-22 | End: 2026-04-22

## 2025-04-22 RX ORDER — MONTELUKAST SODIUM 10 MG/1
10 TABLET ORAL NIGHTLY
Qty: 30 TABLET | Refills: 6 | Status: SHIPPED | OUTPATIENT
Start: 2025-04-22

## 2025-04-22 RX ORDER — OMEPRAZOLE 20 MG/1
20 CAPSULE, DELAYED RELEASE ORAL DAILY
Qty: 30 CAPSULE | Refills: 3 | Status: SHIPPED | OUTPATIENT
Start: 2025-04-22 | End: 2026-04-22

## 2025-05-27 ENCOUNTER — OFFICE VISIT (OUTPATIENT)
Dept: OTOLARYNGOLOGY | Facility: CLINIC | Age: 20
End: 2025-05-27
Payer: COMMERCIAL

## 2025-05-27 VITALS
DIASTOLIC BLOOD PRESSURE: 79 MMHG | HEART RATE: 96 BPM | SYSTOLIC BLOOD PRESSURE: 117 MMHG | BODY MASS INDEX: 25.5 KG/M2 | WEIGHT: 139.44 LBS

## 2025-05-27 DIAGNOSIS — J30.9 CHRONIC ALLERGIC RHINITIS: Primary | Chronic | ICD-10-CM

## 2025-05-27 DIAGNOSIS — Z98.890 S/P FUNCTIONAL ENDOSCOPIC SINUS SURGERY: ICD-10-CM

## 2025-05-27 PROCEDURE — 3008F BODY MASS INDEX DOCD: CPT | Mod: CPTII,S$GLB,, | Performed by: OTOLARYNGOLOGY

## 2025-05-27 PROCEDURE — 31231 NASAL ENDOSCOPY DX: CPT | Mod: 52,S$GLB,, | Performed by: OTOLARYNGOLOGY

## 2025-05-27 PROCEDURE — 99999 PR PBB SHADOW E&M-EST. PATIENT-LVL III: CPT | Mod: PBBFAC,,, | Performed by: OTOLARYNGOLOGY

## 2025-05-27 PROCEDURE — 99214 OFFICE O/P EST MOD 30 MIN: CPT | Mod: 25,S$GLB,, | Performed by: OTOLARYNGOLOGY

## 2025-05-27 PROCEDURE — 1159F MED LIST DOCD IN RCRD: CPT | Mod: CPTII,S$GLB,, | Performed by: OTOLARYNGOLOGY

## 2025-05-27 PROCEDURE — 3074F SYST BP LT 130 MM HG: CPT | Mod: CPTII,S$GLB,, | Performed by: OTOLARYNGOLOGY

## 2025-05-27 PROCEDURE — 3078F DIAST BP <80 MM HG: CPT | Mod: CPTII,S$GLB,, | Performed by: OTOLARYNGOLOGY

## 2025-05-27 RX ORDER — LEVOCETIRIZINE DIHYDROCHLORIDE 5 MG/1
5 TABLET, FILM COATED ORAL NIGHTLY
Qty: 30 TABLET | Refills: 11 | Status: SHIPPED | OUTPATIENT
Start: 2025-05-27 | End: 2026-05-27

## 2025-05-27 RX ORDER — AZELASTINE 1 MG/ML
1 SPRAY, METERED NASAL 2 TIMES DAILY
Qty: 30 ML | Refills: 3 | Status: SHIPPED | OUTPATIENT
Start: 2025-05-27 | End: 2026-05-27

## 2025-05-27 RX ORDER — FLUTICASONE PROPIONATE 50 MCG
2 SPRAY, SUSPENSION (ML) NASAL DAILY
Qty: 9.9 ML | Refills: 11 | Status: SHIPPED | OUTPATIENT
Start: 2025-05-27

## 2025-05-27 RX ORDER — MONTELUKAST SODIUM 10 MG/1
10 TABLET ORAL NIGHTLY
Qty: 30 TABLET | Refills: 6 | Status: SHIPPED | OUTPATIENT
Start: 2025-05-27

## 2025-05-27 NOTE — PROGRESS NOTES
Subjective:      Lupe Machuca is a 19 y.o. female who comes for follow-up of allergic rhinitis.  She has history of septoplasty, SMRT,  and endoscopic crow bullosa excision 12/2024. She has been using nasal saline rinses as directed. She has been taking Flonase, xyzal and montelukast. Reports feeling of nasal congestion. She relays that the spring season at school in [place] seemed to be worse for her allergy symptoms- increased nasal congestion, sneezing, rhinorrhea.     Objective:     /79   Pulse 96   Wt 63.3 kg (139 lb 7.1 oz)   BMI 25.50 kg/m²      Constitutional: Well appearing / communicating.  NAD.  Eyes: EOM I Bilaterally  Head/Face: Normocephalic.  Negative paranasal sinus pressure/tenderness.  Salivary glands WNL.  House Brackmann I Bilaterally.    Right Ear: External Auditory Canal WNL,TM w/o masses/lesions/perforations.  Auricle WNL.  Left Ear: External Auditory Canal WNL,TM w/o masses/lesions/perforations. Auricle WNL.  Nose:  Nasal septum intact; Inferior Turbinates edematous bilaterally- left greater than right. No septal perforation. No masses/lesions. External nasal skin without masses/lesions.  Oral Cavity: Gingiva/lips WNL.  FOM Soft, no masses palpated. Oral Tongue mobile. Hard Palate WNL.   Oropharynx: BOT WNL. No masses/lesions noted. Tonsillar fossa/pharyngeal wall without lesions. Posterior oropharynx WNL.  Soft palate without masses. Midline uvula.   Neck/Lymphatic: No LAD I-VI bilaterally.  No thyromegaly.  No masses noted on exam.      Procedure     None        Data Reviewed   Latest Reference Range & Units 07/12/24 09:57   A. alternata IgE <0.10 kU/L <0.10   Altern. alternata Class  CLASS 0   Aspergillus Fumigatus IgE <0.10 kU/L <0.10   A. fumigatus Class  CLASS 0   Allergen Sheep Ponderosa ([place]) IgE <0.10 kU/L <0.10   Allergen Sheep Ponderosa ([place]) Class  CLASS 0   Bald Rowley Class  CLASS 0   Bermuda Grass IgE <0.10 kU/L 0.15 (H)   Bermuda Grass Class  CLASS 0/1    Boxelder Maple Tree IgE <0.10 kU/L 0.62 (H)   Allergen Maple ([place]) Class  CLASS 1   Cat Dander IgE <0.10 kU/L 3.46 (H)   Cat Epithelium Class  CLASS 2   [place] IgE <0.10 kU/L <0.10   [place] Class  CLASS 0   Cladosporium Class  CLASS 0   Cladosporium IgE <0.10 kU/L <0.10   Cocklebur Class  CLASS 0/1   Cocklebur IgE <0.10 kU/L 0.12 (H)   [place] <0.10 kU/L <0.10   D. farinae <0.10 kU/L 22.30 (H)   D. farinae Class  CLASS 4   D. pteronyssinus Dust Mite IgE <0.10 kU/L 13.60 (H)   D. pteronyssinus Class  CLASS 3   Dog Dander IgE <0.10 kU/L 44.80 (H)   Dog Dander Class  CLASS 4   Elm [place], IgE <0.10 kU/L 0.48 (H)   Elm [place] Class  CLASS 1   English Plantain IgE <0.10 kU/L <0.10   English Plantain Class  CLASS 0   Feather Panel #2 <0.70 kU/L <0.10   [place] Grass IgE <0.10 kU/L 0.28 (H)   [place] Grass Class  CLASS 0/1   Lamb Quarters IgE <0.10 kU/L 0.14 (H)   Allergen Mckeon's Quarters Class  CLASS 0/1   Marshelder IgE <0.10 kU/L 0.10   Marshelder Class  CLASS 0/1   Meadow Grass ([place]), IgE <0.10 kU/L 0.66 (H)   Meadow Grass ([place] Blue) Class  CLASS 1   Mucor racemosus, IgE <0.10 kU/L <0.10   Mucor racemosus Class  CLASS 0   [place]ren Tree IgE <0.10 kU/L <0.10   Allergen [place] Class  CLASS 0   MUGWORT <0.10 kU/L <0.10   Mugwort Class  CLASS 0   NETTLE <0.10 kU/L 0.42 (H)   Nettle Class  CLASS 1   [place], Class  CLASS 3   Pecan Emblem Tree IgE <0.10 kU/L 2.29 (H)   Pecan, Class  CLASS 2   Penicillium IgE <0.10 kU/L <0.10   Penicillium Class  CLASS 0   Pigweed IgE <0.10 kU/L <0.10   Common Pigweed Class  CLASS 0   Ragweed, Short, Class  CLASS 0/1   Ragweed, Short, IgE <0.10 kU/L 0.28 (H)   RAST Allergen for [place] <0.70 kU/L 0.46   Silver Birch Tree IgE <0.10 kU/L 9.35 (H)   Silver Birch Class  CLASS 3   Aditya Grass IgE <0.10 kU/L 0.36 (H)   Aditya Grass Class  CLASS 1   White Mychal Tree IgE <0.10 kU/L 1.04 (H)   White Mychal Class  CLASS 2   [place] Tree IgE <0.10 kU/L 14.50 (H)   (H): Data is  abnormally high        Assessment:         1. Chronic allergic rhinitis    2. S/P functional endoscopic sinus surgery           Plan:   Continue b.i.d. saline irrigations.    Flonase 2 sprays per nostril daily  Astelin 2 spray per nostril BID  Continue  xyzal 5 mg PO daily  montelukast 10 mg PO nightly  Keep follow up with allergist; may need to consider immunotherapy if symptoms still not controlled with maximal medical therapy.   Follow up in 3-4 months for recheck    Jo Fairchild MD

## 2025-05-27 NOTE — PROCEDURES
Nasal/sinus endoscopy    Date/Time: 5/27/2025 11:00 AM    Performed by: Jo Sanderson MD  Authorized by: Jo Sanderson MD    Consent Done?:  Yes (Verbal)  Anesthesia:     Local anesthetic:  Lidocaine 2% and Pato-Synephrine 1/2%  Nose:     Procedure Performed:  Nasal Endoscopy  External:      No external nasal deformity  Intranasal:      Mucosa no polyps     Mucosa ulcers not present     No mucosa lesions present     Turbinates not enlarged     No septum gross deformity  Nasopharynx:      Posterior choanae patent     Eustachian tube patent      Inferior Turbinates boggy, edematous, with clear rhinorrhea present  bilaterally; nasal septum intact. Bilateral middle turbinate with mild  edema; patent middle meatus bilaterally.  The superior turbinates within normal limits bilaterally; bilateral  sphenoethmoidal recesses patent.

## 2025-06-19 ENCOUNTER — OFFICE VISIT (OUTPATIENT)
Dept: PSYCHIATRY | Facility: CLINIC | Age: 20
End: 2025-06-19
Payer: COMMERCIAL

## 2025-06-19 DIAGNOSIS — F41.1 GAD (GENERALIZED ANXIETY DISORDER): ICD-10-CM

## 2025-06-19 DIAGNOSIS — F31.9 BIPOLAR 1 DISORDER: Primary | ICD-10-CM

## 2025-06-19 PROCEDURE — 98005 SYNCH AUDIO-VIDEO EST LOW 20: CPT | Mod: 95,,, | Performed by: PSYCHIATRY & NEUROLOGY

## 2025-06-19 PROCEDURE — G2211 COMPLEX E/M VISIT ADD ON: HCPCS | Mod: 95,,, | Performed by: PSYCHIATRY & NEUROLOGY

## 2025-06-19 NOTE — PROGRESS NOTES
"The patient location is: [place]  The chief complaint leading to consultation is: follow-up    Visit type: audiovisual    Face to Face time with patient: 12 minutes  23 minutes of total time spent on the encounter, which includes face to face time and non-face to face time preparing to see the patient (eg, review of tests), Obtaining and/or reviewing separately obtained history, Documenting clinical information in the electronic or other health record, Independently interpreting results (not separately reported) and communicating results to the patient/family/caregiver, or Care coordination (not separately reported).         Each patient to whom he or she provides medical services by telemedicine is:  (1) informed of the relationship between the physician and patient and the respective role of any other health care provider with respect to management of the patient; and (2) notified that he or she may decline to receive medical services by telemedicine and may withdraw from such care at any time.    [facility]      Return Psychiatry Note    6/29/2025    History of Present Illness    CHIEF COMPLAINT:  A 19-year-old female with generalized anxiety and bipolar I disorder presents for a follow-up visit to discuss medication management and recent symptoms, last seen on March 28th, 2025.    HPI:  Lupe experienced a manic episode prior to the last visit in March 2025. She reports having an "outburst" lasting approximately two days, characterized by continuous arguments with her sisters. Lupe describes "picking things" from previous days and bringing them up again, leading to family conflicts. She feels her sisters make fun of her and "pin up against" her, causing her to defend herself. Her mother perceives this as the patient "acting out."    Lupe reports experiencing episodes of depression, stating "I do get like depressed where I just want to die." She believes her current medication (Lexapro) is not " "helping and may be making depressive symptoms worse. Lupe expresses belief that Lexapro is interfering with Lamictal and feels that her body has gotten used to Lamictal. She continues to experience "loads of outbursts."    Lupe notes difficulty in family interactions, particularly with sisters, which may be contributing to mood symptoms and outbursts. She denies feeling consistently anxious or depressed.    MEDICATIONS:  Lupe is on Lamictal 100 mg daily.. She is also taking Lexapro 20 mg daily, and she is on Trazodone 100 mg daily.    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     ROS:  General: -fever, -chills, -fatigue, -weight gain, -weight loss  Eyes: -vision changes, -redness, -discharge  ENT: -ear pain, -nasal congestion, -sore throat  Cardiovascular: -chest pain, -palpitations, -lower extremity edema  Respiratory: -cough, -shortness of breath  Gastrointestinal: -abdominal pain, -nausea, -vomiting, -diarrhea, -constipation, -blood in stool  Genitourinary: -dysuria, -hematuria, -frequency  Musculoskeletal: -joint pain, -muscle pain  Skin: -rash, -lesion  Neurological: -headache, -dizziness, -numbness, -tingling  Psychiatric: -anxiety, +depression, -sleep difficulty, +suicidal ideation, +irritability, +anger problems, +agitation, +mood swings  A review of 10+ systems was conducted with pertinent positive and negative findings documented in HPI with all other systems reviewed and negative.    Past medical, family, surgical and social history reviewed as documented in chart with pertinent positive medical, family, surgical and social history detailed in HPI.    Exam Findings:    Physical Exam    Appearance: Appears stated age. Well-groomed. Well-nourished.  Speech: Normal rate. Normal volume. Spontaneous and fluid.  Affect: Appropriate.  Thought Content: No evidence of aggression. No evidence of homicidal ideation. No evidence of " homicidal plan. No evidence of homicidal intent. No evidence of suicidal ideation. No evidence of suicidal plan. No evidence of suicidal intent. No evidence of delusions.  Memory: Recent memory intact. Remote memory intact.  Behavior: Cooperative. Good eye contact. Engaged. Pleasant.  Mood: Euthymic.  Thought Form: Linear thinking. Goal oriented and directed.  Perception: No perceptual abnormalities noted.  Judgement: Intact as evidenced by decision making in the recent past.  Insight: Good insight into symptoms. Good insight into treatment options.  Cognition: A&Ox3. Normal attention span. Average fund of knowledge.  Motor: No gross motor abnormalities.          Assessment/Plan:    Assessment & Plan    BIPOLAR DISORDER/ RUPINDER:  - Considered tapering Lexapro due to patient's concern about interference with Lamictal and lack of efficacy for depression.  - Agreed with increase of Lamictal to 150 mg daily to address ongoing mood symptoms.  - Explained that medication changes typically take a few weeks to show full effect.  - Discussed potential withdrawal effects from Lexapro, including lethargy and flu-like symptoms.  - Recommend gradual Lexapro taper: decrease from 20 mg to 10 mg daily for 1 week, then to 5 mg daily for 1 week, then discontinue.  - Acknowledged appropriateness of medication adjustments by [place] psychiatrist when patient is away at school.    FOLLOW-UP CARE:  - Follow up in mid-July to assess medication changes and symptom response.

## 2025-06-23 DIAGNOSIS — F32.1 CURRENT MODERATE EPISODE OF MAJOR DEPRESSIVE DISORDER WITHOUT PRIOR EPISODE: ICD-10-CM

## 2025-06-24 RX ORDER — LAMOTRIGINE 150 MG/1
150 TABLET ORAL DAILY
Qty: 30 TABLET | Refills: 2 | Status: SHIPPED | OUTPATIENT
Start: 2025-06-24 | End: 2026-06-24

## 2025-07-09 ENCOUNTER — TELEPHONE (OUTPATIENT)
Dept: OTOLARYNGOLOGY | Facility: CLINIC | Age: 20
End: 2025-07-09
Payer: COMMERCIAL

## 2025-07-22 RX ORDER — LEVOCETIRIZINE DIHYDROCHLORIDE 5 MG/1
5 TABLET, FILM COATED ORAL NIGHTLY
Qty: 30 TABLET | Refills: 11 | Status: SHIPPED | OUTPATIENT
Start: 2025-07-22 | End: 2026-07-22

## 2025-07-22 RX ORDER — MONTELUKAST SODIUM 10 MG/1
10 TABLET ORAL NIGHTLY
Qty: 30 TABLET | Refills: 6 | Status: SHIPPED | OUTPATIENT
Start: 2025-07-22

## 2025-08-08 ENCOUNTER — PATIENT MESSAGE (OUTPATIENT)
Dept: PSYCHIATRY | Facility: CLINIC | Age: 20
End: 2025-08-08
Payer: COMMERCIAL

## 2025-08-08 ENCOUNTER — OFFICE VISIT (OUTPATIENT)
Dept: PSYCHIATRY | Facility: CLINIC | Age: 20
End: 2025-08-08
Payer: COMMERCIAL

## 2025-08-08 DIAGNOSIS — F41.1 GAD (GENERALIZED ANXIETY DISORDER): ICD-10-CM

## 2025-08-08 DIAGNOSIS — F31.9 BIPOLAR 1 DISORDER: Primary | ICD-10-CM

## 2025-08-08 RX ORDER — LAMOTRIGINE 100 MG/1
100 TABLET ORAL DAILY
Qty: 30 TABLET | Refills: 2 | Status: SHIPPED | OUTPATIENT
Start: 2025-08-08 | End: 2026-08-08

## 2025-08-08 RX ORDER — QUETIAPINE FUMARATE 25 MG/1
25 TABLET, FILM COATED ORAL NIGHTLY
Qty: 30 TABLET | Refills: 2 | Status: SHIPPED | OUTPATIENT
Start: 2025-08-08 | End: 2026-08-08

## 2025-08-08 RX ORDER — SERTRALINE HYDROCHLORIDE 25 MG/1
25 TABLET, FILM COATED ORAL DAILY
Qty: 30 TABLET | Refills: 2 | Status: SHIPPED | OUTPATIENT
Start: 2025-08-08 | End: 2025-08-08 | Stop reason: CLARIF

## 2025-08-08 NOTE — PROGRESS NOTES
The patient location is: [place]  The chief complaint leading to consultation is: follow-up    Visit type: audiovisual    Face to Face time with patient: 12 minutes  31 minutes of total time spent on the encounter, which includes face to face time and non-face to face time preparing to see the patient (eg, review of tests), Obtaining and/or reviewing separately obtained history, Documenting clinical information in the electronic or other health record, Independently interpreting results (not separately reported) and communicating results to the patient/family/caregiver, or Care coordination (not separately reported).         Each patient to whom he or she provides medical services by telemedicine is:  (1) informed of the relationship between the physician and patient and the respective role of any other health care provider with respect to management of the patient; and (2) notified that he or she may decline to receive medical services by telemedicine and may withdraw from such care at any time.    [facility]      Return Psychiatry Note    8/8/2025    History of Present Illness    CHIEF COMPLAINT:  Lupe, a 19-year-old female with bipolar disorder and generalized anxiety disorder, presents for a follow-up visit to discuss medications and mood stabilization, last seen approximately six weeks ago on June 29th.    HPI:  Lupe's Lamictal was increased to 150 mg daily at the last visit, and Lexapro was tapered and discontinued. She experienced sickness for about 1.5 weeks after discontinuing Lexapro but now feels physically fine. Lupe reports experiencing auditory and visual hallucinations at night only, which started occurring occasionally when she began taking Lamictal and increased in frequency as the dosage was increased. Her therapist suggested hospitalization due to these hallucinations, but the patient declined as she was not having thoughts of harming herself or others.    Lupe describes her  "current mood as "not good, sad, manic." She reports having many stressors related to family life and work. Her therapist noted that these stressors could be contributing to her symptoms. Lupe is preparing to leave for school in about three weeks. She typically has therapy sessions every two weeks but recently had an additional session due to increased stressors. The therapist recommended discussing medication changes with the psychiatrist.    Lupe denies thoughts of harming herself or others and any formal medical diagnoses other than bipolar disorder and generalized anxiety disorder.    MEDICATIONS:  Lupe is on Lamictal 150 mg daily, she is also taking Trazodone 100 mg at bedtime.    LIFESTYLE:  Lupe is returning to school in 20 days. 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      ROS:  General: -fever, -chills, -fatigue, -weight gain, -weight loss  Eyes: -vision changes, -redness, -discharge  ENT: -ear pain, -nasal congestion, -sore throat  Cardiovascular: -chest pain, -palpitations, -lower extremity edema  Respiratory: -cough, -shortness of breath  Gastrointestinal: -abdominal pain, -nausea, -vomiting, -diarrhea, -constipation, -blood in stool  Genitourinary: -dysuria, -hematuria, -frequency  Musculoskeletal: -joint pain, -muscle pain  Skin: -rash, -lesion  Neurological: -headache, -dizziness, -numbness, -tingling  Psychiatric: -anxiety, -depression, -sleep difficulty, +hallucinations, +new or worsening mood changes  A review of 10+ systems was conducted with pertinent positive and negative findings documented in HPI with all other systems reviewed and negative.    Past medical, family, surgical and social history reviewed as documented in chart with pertinent positive medical, family, surgical and social history detailed in HPI.    Exam Findings:    Physical Exam    Appearance: Appears stated age. Well-groomed. Well-nourished.  Speech: " Normal rate. Normal volume. Spontaneous and fluid.  Affect: Appropriate.  Thought Content: No evidence of aggression. No evidence of homicidal ideation. No evidence of homicidal plan. No evidence of homicidal intent. No evidence of suicidal ideation. No evidence of suicidal plan. No evidence of suicidal intent. No evidence of delusions.  Memory: Recent memory intact. Remote memory intact.  Behavior: Cooperative. Good eye contact. Engaged. Pleasant.  Mood: Dysphoric.  Thought Form: Linear thinking. Goal oriented and directed.  Perception: No perceptual abnormalities noted.  Judgement: Intact as evidenced by decision making in the recent past.  Insight: Good insight into symptoms. Good insight into treatment options.  Cognition: A&Ox3. Normal attention span. Average fund of knowledge.  Motor: No gross motor abnormalities.          Assessment/Plan:    Assessment & Plan    BIPOLAR DISORDER:  - Initiated Seroquel (quetiapine) 25 mg at bedtime as alternative mood stabilizer and antipsychotic option, can begin tonight.  - Explained it is used for bipolar disorder and depression.  - Reviewed potential side effects including:  - Dystonic reactions and abnormal movements (though less likely than with traditional antipsychotics).  - Metabolic effects: increased appetite, weight gain, elevated glucose and triglycerides.  - Noted it may be sedating and could improve sleep.  - Decreased Lamictal from 150 mg to 100 mg daily due to report of increased hallucinations with dose increases, despite rarity of this side effect.  - Discontinued trazodone.    INPATIENT CARE:  - Discussed potential for inpatient admission if outpatient management proves insufficient, noting it would allow for more rapid medication adjustments.  - Weighed risks and benefits of medication changes given limited time before patient returns to school.    FOLLOW-UP CARE:  - Follow up in 1-2 weeks to assess medication efficacy and make further adjustments if  needed.  - Virtual or in-person visit options available.  - Contact the office if scheduling difficulties arise to ensure timely appointment due to limited timeframe before returning to school.

## 2025-08-13 ENCOUNTER — OFFICE VISIT (OUTPATIENT)
Dept: OTOLARYNGOLOGY | Facility: CLINIC | Age: 20
End: 2025-08-13
Payer: COMMERCIAL

## 2025-08-13 VITALS
BODY MASS INDEX: 24.97 KG/M2 | DIASTOLIC BLOOD PRESSURE: 74 MMHG | WEIGHT: 136.5 LBS | HEART RATE: 81 BPM | SYSTOLIC BLOOD PRESSURE: 121 MMHG

## 2025-08-13 DIAGNOSIS — J31.0 RHINITIS MEDICAMENTOSA: ICD-10-CM

## 2025-08-13 DIAGNOSIS — Z98.890 HISTORY OF NASAL SURGERY: ICD-10-CM

## 2025-08-13 DIAGNOSIS — J34.89 NASAL OBSTRUCTION: Primary | ICD-10-CM

## 2025-08-13 DIAGNOSIS — T48.5X5A RHINITIS MEDICAMENTOSA: ICD-10-CM

## 2025-08-13 PROCEDURE — 1159F MED LIST DOCD IN RCRD: CPT | Mod: CPTII,S$GLB,, | Performed by: STUDENT IN AN ORGANIZED HEALTH CARE EDUCATION/TRAINING PROGRAM

## 2025-08-13 PROCEDURE — 99214 OFFICE O/P EST MOD 30 MIN: CPT | Mod: S$GLB,,, | Performed by: STUDENT IN AN ORGANIZED HEALTH CARE EDUCATION/TRAINING PROGRAM

## 2025-08-13 PROCEDURE — 3078F DIAST BP <80 MM HG: CPT | Mod: CPTII,S$GLB,, | Performed by: STUDENT IN AN ORGANIZED HEALTH CARE EDUCATION/TRAINING PROGRAM

## 2025-08-13 PROCEDURE — 3074F SYST BP LT 130 MM HG: CPT | Mod: CPTII,S$GLB,, | Performed by: STUDENT IN AN ORGANIZED HEALTH CARE EDUCATION/TRAINING PROGRAM

## 2025-08-13 PROCEDURE — 3008F BODY MASS INDEX DOCD: CPT | Mod: CPTII,S$GLB,, | Performed by: STUDENT IN AN ORGANIZED HEALTH CARE EDUCATION/TRAINING PROGRAM

## 2025-08-13 PROCEDURE — 1160F RVW MEDS BY RX/DR IN RCRD: CPT | Mod: CPTII,S$GLB,, | Performed by: STUDENT IN AN ORGANIZED HEALTH CARE EDUCATION/TRAINING PROGRAM

## 2025-08-20 DIAGNOSIS — J34.3 HYPERTROPHY OF INFERIOR NASAL TURBINATE: ICD-10-CM

## 2025-08-20 DIAGNOSIS — J34.89 NASAL OBSTRUCTION: Primary | ICD-10-CM

## 2025-08-20 DIAGNOSIS — J31.0 RHINITIS MEDICAMENTOSA: ICD-10-CM

## 2025-08-20 DIAGNOSIS — J34.89 CONCHA BULLOSA: ICD-10-CM

## 2025-08-20 DIAGNOSIS — J30.9 CHRONIC ALLERGIC RHINITIS: ICD-10-CM

## 2025-08-20 DIAGNOSIS — J34.2 NASAL SEPTAL DEVIATION: ICD-10-CM

## 2025-08-20 DIAGNOSIS — Z98.890 HISTORY OF NASAL SURGERY: ICD-10-CM

## 2025-08-20 DIAGNOSIS — Z98.890 S/P FUNCTIONAL ENDOSCOPIC SINUS SURGERY: ICD-10-CM

## 2025-08-20 DIAGNOSIS — T48.5X5A RHINITIS MEDICAMENTOSA: ICD-10-CM

## 2025-08-25 ENCOUNTER — OFFICE VISIT (OUTPATIENT)
Dept: PSYCHIATRY | Facility: CLINIC | Age: 20
End: 2025-08-25
Payer: COMMERCIAL

## 2025-08-25 DIAGNOSIS — F31.9 BIPOLAR 1 DISORDER: Primary | ICD-10-CM

## 2025-08-25 DIAGNOSIS — F41.1 GAD (GENERALIZED ANXIETY DISORDER): ICD-10-CM

## 2025-08-25 PROCEDURE — 90833 PSYTX W PT W E/M 30 MIN: CPT | Mod: 95,,, | Performed by: PSYCHIATRY & NEUROLOGY

## 2025-08-25 PROCEDURE — G2211 COMPLEX E/M VISIT ADD ON: HCPCS | Mod: 95,,, | Performed by: PSYCHIATRY & NEUROLOGY

## 2025-08-25 PROCEDURE — 98006 SYNCH AUDIO-VIDEO EST MOD 30: CPT | Mod: 95,,, | Performed by: PSYCHIATRY & NEUROLOGY

## 2025-08-25 RX ORDER — LAMOTRIGINE 100 MG/1
100 TABLET ORAL DAILY
Qty: 90 TABLET | Refills: 0 | Status: SHIPPED | OUTPATIENT
Start: 2025-08-25 | End: 2026-08-25

## 2025-08-25 RX ORDER — QUETIAPINE FUMARATE 50 MG/1
50 TABLET, FILM COATED ORAL NIGHTLY
Qty: 90 TABLET | Refills: 0 | Status: SHIPPED | OUTPATIENT
Start: 2025-08-25 | End: 2026-08-25

## (undated) DEVICE — SPONGE NEURO 1/2 X 2

## (undated) DEVICE — ELECTRODE REM PLYHSV RETURN 9

## (undated) DEVICE — PAD GROUND UNIV STYLE CORD 9IN

## (undated) DEVICE — TRAY SKIN SCRUB WET PREMIUM

## (undated) DEVICE — R CATH QUADRIPOLAR 5FRX120CM

## (undated) DEVICE — CABLE ELECTRICAL UMBILICAL

## (undated) DEVICE — R CATH QPLR HIS CURV 5FRX110CM

## (undated) DEVICE — SUT CHROMIC 3-0 SH 27IN GUT

## (undated) DEVICE — PAD DEFIB CADENCE ADULT R2

## (undated) DEVICE — CATH SURG CRYOABL XTRA3 MD CV

## (undated) DEVICE — CLOSURE SKIN STERI STRIP 1/2X4

## (undated) DEVICE — DRESSING TELFA N ADH 3X8

## (undated) DEVICE — PACKING NASAL 4CM X 4CM ST

## (undated) DEVICE — DRESSING NASOPORE FD 8CM

## (undated) DEVICE — ADHESIVE MASTISOL VIAL 48/BX

## (undated) DEVICE — SYR 10CC LUER LOCK

## (undated) DEVICE — DEVICE COMPR SAFEGUARD 24CM

## (undated) DEVICE — COVER OVERHEAD SURG LT BLUE

## (undated) DEVICE — POSITIONER HEEL FOAM CONVOLTD

## (undated) DEVICE — SOL NACL 0.9% IV INJ 500ML

## (undated) DEVICE — GLOVE BIOGEL ULTRATOUCH 6.5

## (undated) DEVICE — R CATH POLARIS X 6FR 105CM

## (undated) DEVICE — CABLE COAXIAL UMBILICAL

## (undated) DEVICE — DRAPE INSTR MAGNETIC 10X16IN

## (undated) DEVICE — PACK SURGERY START

## (undated) DEVICE — KIT ENSITE ELECTRODE SURFACE

## (undated) DEVICE — GOWN POLY REINF BRTH SLV XL

## (undated) DEVICE — DRESSING TEGADERM 2 3/8 X 2.75

## (undated) DEVICE — CONTAINER MULTIPURPOSE/SPECIME

## (undated) DEVICE — TUBING CLEARVISION II

## (undated) DEVICE — LINE PRESSURE MONITORING 96IN

## (undated) DEVICE — GOWN POLY REINF BRTH SLV LG

## (undated) DEVICE — INTRODUCER HEMOSTASIS 5.5FR

## (undated) DEVICE — PACK EENT SURG II ECLIPSE

## (undated) DEVICE — R CATH QUADRAPOLAR 6FRX110CM

## (undated) DEVICE — INTRODUCER HEMOSTASIS 6.5FR

## (undated) DEVICE — BLADE INFERIOR TURBINATE 2MM

## (undated) DEVICE — NDL HYPO REG 25G X 1 1/2

## (undated) DEVICE — PACK EP DRAPE OMC

## (undated) DEVICE — SUT 4-0 CHROMIC GUT / P-3

## (undated) DEVICE — KIT SINUS ENDOSCOPY PACKNG

## (undated) DEVICE — INTRODUCER HEMOSTASIS 7.5F

## (undated) DEVICE — SUT 2/0 30IN SILK BLK BRAI

## (undated) DEVICE — SPLINT INTRANASAL STRL SOFT

## (undated) DEVICE — SUT PLN GUT 4-0 SC-1SC-1 1

## (undated) DEVICE — TUBING SUC UNIV W/CONN 12FT

## (undated) DEVICE — TOWEL OR XRAY BLUE 17X26IN